# Patient Record
Sex: FEMALE | Race: BLACK OR AFRICAN AMERICAN | NOT HISPANIC OR LATINO | Employment: FULL TIME | RURAL
[De-identification: names, ages, dates, MRNs, and addresses within clinical notes are randomized per-mention and may not be internally consistent; named-entity substitution may affect disease eponyms.]

---

## 2017-11-29 ENCOUNTER — OFFICE VISIT (OUTPATIENT)
Dept: OBSTETRICS AND GYNECOLOGY | Facility: CLINIC | Age: 62
End: 2017-11-29

## 2017-11-29 VITALS
DIASTOLIC BLOOD PRESSURE: 73 MMHG | SYSTOLIC BLOOD PRESSURE: 141 MMHG | HEIGHT: 67 IN | WEIGHT: 236 LBS | BODY MASS INDEX: 37.04 KG/M2

## 2017-11-29 DIAGNOSIS — N81.6 POP-Q STAGE 3 RECTOCELE: ICD-10-CM

## 2017-11-29 DIAGNOSIS — N81.10 POP-Q STAGE 3 CYSTOCELE: ICD-10-CM

## 2017-11-29 DIAGNOSIS — N81.3 UTERINE PROCIDENTIA: Primary | Chronic | ICD-10-CM

## 2017-11-29 PROBLEM — E11.9 DIABETES MELLITUS: Chronic | Status: ACTIVE | Noted: 2017-11-29

## 2017-11-29 PROBLEM — E66.9 OBESITY, CLASS II, BMI 35-39.9: Chronic | Status: ACTIVE | Noted: 2017-11-29

## 2017-11-29 PROBLEM — I10 HYPERTENSION: Chronic | Status: ACTIVE | Noted: 2017-11-29

## 2017-11-29 PROBLEM — E11.9 TYPE 2 DIABETES MELLITUS WITHOUT COMPLICATION: Chronic | Status: ACTIVE | Noted: 2017-11-29

## 2017-11-29 PROCEDURE — 99204 OFFICE O/P NEW MOD 45 MIN: CPT | Performed by: OBSTETRICS & GYNECOLOGY

## 2017-11-29 RX ORDER — DILTIAZEM HYDROCHLORIDE 120 MG/1
TABLET, FILM COATED ORAL
COMMUNITY
Start: 2017-10-20

## 2017-11-29 RX ORDER — LINAGLIPTIN 5 MG/1
TABLET, FILM COATED ORAL
COMMUNITY
Start: 2017-10-18 | End: 2020-08-14

## 2017-11-29 RX ORDER — LISINOPRIL 40 MG/1
TABLET ORAL
COMMUNITY
Start: 2017-09-05

## 2017-11-29 RX ORDER — INSULIN DETEMIR 100 [IU]/ML
INJECTION, SOLUTION SUBCUTANEOUS
COMMUNITY
Start: 2017-10-17 | End: 2020-08-14

## 2017-11-29 RX ORDER — PROMETHAZINE HYDROCHLORIDE 25 MG/1
TABLET ORAL
COMMUNITY
Start: 2017-10-26 | End: 2020-12-03

## 2017-11-29 RX ORDER — ATORVASTATIN CALCIUM 40 MG/1
TABLET, FILM COATED ORAL
COMMUNITY
Start: 2017-11-07

## 2017-11-29 RX ORDER — GLIMEPIRIDE 4 MG/1
TABLET ORAL
COMMUNITY
Start: 2017-10-17 | End: 2020-08-14

## 2017-11-30 NOTE — PROGRESS NOTES
Ellen Storey is a 62 y.o. y/o female     Chief Complaint: My womb is low.    HPI: 62-year-old  with two prior vaginal deliveries.  Referred by Dr. Isauro Prado for pelvic organ prolapse.    She has hypertension and type 2 diabetes requiring insulin.    No known drug allergies.    She is single.  She states that she has not been sexually active for 20 years, she wants to leave that option open.    She is a nonsmoker.    She states that for quite some time she has known that her womb was low.  Does not cause her any pain.  She has not had any vaginal bleeding for about 8 years.  No urinary incontinence.  No problems with constipation.  She has a little bit of pressure but nothing real significant.    She was not aware that there were nonsurgical options for the treatment of pelvic prolapse.  We discussed pelvic floor exercises and pelvic for physical therapy and pessary use.  We also discussed surgical treatment.    She has an elongated cervix with procidentia and third-degree cystocele and third-degree rectocele.    Review of Systems   Constitutional: Negative for activity change, appetite change, chills, diaphoresis, fatigue, fever and unexpected weight change.   Gastrointestinal: Positive for diarrhea, nausea and vomiting. Negative for abdominal pain and constipation.   Genitourinary: Negative for difficulty urinating, dyspareunia, dysuria, menstrual problem, pelvic pain, urgency, vaginal bleeding, vaginal discharge and vaginal pain.   Neurological: Negative for headaches.   Psychiatric/Behavioral: Negative for dysphoric mood. The patient is not nervous/anxious.    All other systems reviewed and are negative.     Breast ROS: negative    The following portions of the patient's history were reviewed and updated as appropriate: allergies, current medications, past family history, past medical history, past social history, past surgical history and problem list.    No Known Allergies       Current Outpatient  "Prescriptions:   •  atorvastatin (LIPITOR) 40 MG tablet, , Disp: , Rfl:   •  diltiaZEM (CARDIZEM) 120 MG tablet, , Disp: , Rfl:   •  glimepiride (AMARYL) 4 MG tablet, , Disp: , Rfl:   •  LEVEMIR FLEXTOUCH 100 UNIT/ML injection, , Disp: , Rfl:   •  lisinopril (PRINIVIL,ZESTRIL) 40 MG tablet, , Disp: , Rfl:   •  metFORMIN (GLUCOPHAGE) 1000 MG tablet, , Disp: , Rfl:   •  promethazine (PHENERGAN) 25 MG tablet, , Disp: , Rfl:   •  TRADJENTA 5 MG tablet tablet, , Disp: , Rfl:      The patient has a family history of   No family history on file.     Past Medical History:   Diagnosis Date   • Diabetes mellitus    • Hypertension    • Obesity, Class II, BMI 35-39.9 2017   • POP-Q stage 3 cystocele 2017   • Type 2 diabetes mellitus without complication 2017   • Uterine procidentia 2017        OB History      Para Term  AB Living    2 2    2    SAB TAB Ectopic Multiple Live Births                   Social History     Social History   • Marital status: Single     Spouse name: N/A   • Number of children: N/A   • Years of education: N/A     Occupational History   • Not on file.     Social History Main Topics   • Smoking status: Former Smoker   • Smokeless tobacco: Former User   • Alcohol use No   • Drug use: No   • Sexual activity: No     Other Topics Concern   • Not on file     Social History Narrative   • No narrative on file        Past Surgical History:   Procedure Laterality Date   • BREAST BIOPSY          Patient Active Problem List   Diagnosis   • POP-Q stage 3 cystocele   • POP-Q stage 3 rectocele   • Uterine procidentia   • Hypertension   • Type 2 diabetes mellitus without complication   • Obesity, Class II, BMI 35-39.9        Documented Vitals    17 1105   BP: 141/73   Weight: 236 lb (107 kg)   Height: 67\" (170.2 cm)   PainSc: 0-No pain       Physical Exam   Constitutional: She is oriented to person, place, and time. No distress.   Obese black female weighing 236 pounds with " BMI 37.0   HENT:   Head: Normocephalic and atraumatic.   Eyes: Conjunctivae and EOM are normal. Pupils are equal, round, and reactive to light.   Neck: Normal range of motion. Neck supple. No JVD present. No tracheal deviation present. No thyromegaly present.   Cardiovascular: Normal rate, regular rhythm, normal heart sounds and intact distal pulses.  Exam reveals no gallop and no friction rub.    No murmur heard.  Pulmonary/Chest: Effort normal and breath sounds normal. No stridor. No respiratory distress. She has no wheezes. She has no rales. She exhibits no tenderness.   Abdominal: Soft. Bowel sounds are normal. She exhibits no distension and no mass. There is no tenderness. There is no rebound and no guarding. No hernia. Hernia confirmed negative in the right inguinal area and confirmed negative in the left inguinal area.   Genitourinary: Rectal exam shows no external hemorrhoid. No labial fusion. There is no rash, tenderness, lesion or injury on the right labia. There is no rash, tenderness, lesion or injury on the left labia. Uterus is enlarged. Uterus is not fixed and not tender. Cervix exhibits no motion tenderness, no discharge and no friability. Right adnexum displays no mass, no tenderness and no fullness. Left adnexum displays no mass, no tenderness and no fullness.   Genitourinary Comments: Cervix is just outside the vaginal introitus.  Procidentia.  Third-degree cystocele.  Third-degree rectocele.  Cervix is elongated.  Uterus appears to be slightly enlarged and irregular shaped consistent with small fibroids.   Musculoskeletal: Normal range of motion. She exhibits no edema, tenderness or deformity.   Lymphadenopathy:     She has no cervical adenopathy.        Right: No inguinal adenopathy present.        Left: No inguinal adenopathy present.   Neurological: She is alert and oriented to person, place, and time. She has normal reflexes. She displays normal reflexes. No cranial nerve deficit. She  exhibits normal muscle tone. Coordination normal.   Skin: Skin is warm and dry. No rash noted. She is not diaphoretic. No erythema. No pallor.   Psychiatric: She has a normal mood and affect. Her behavior is normal. Judgment and thought content normal.   Nursing note and vitals reviewed.       Assessment        Diagnosis Plan   1. Uterine procidentia     2. POP-Q stage 3 cystocele     3. POP-Q stage 3 rectocele           Plan      1. She is to let me know if things become more symptomatic or if she wants any type of treatment.    2. Encouraged in diet and exercise.  3. Handouts on depression, hot flashes, exercise, and vitamin use.   4. Follow-up in 6 months.  Follow-up sooner as needed.  5. Send copy of note to Dr. Isauro Prado.            This document has been electronically signed by Marek Bob MD on November 29, 2017 9:13 PM

## 2020-08-14 ENCOUNTER — OFFICE VISIT (OUTPATIENT)
Dept: ENDOCRINOLOGY | Facility: CLINIC | Age: 65
End: 2020-08-14

## 2020-08-14 VITALS
BODY MASS INDEX: 35.09 KG/M2 | SYSTOLIC BLOOD PRESSURE: 152 MMHG | HEART RATE: 64 BPM | WEIGHT: 223.6 LBS | OXYGEN SATURATION: 99 % | HEIGHT: 67 IN | DIASTOLIC BLOOD PRESSURE: 68 MMHG

## 2020-08-14 DIAGNOSIS — Z79.4 TYPE 2 DIABETES MELLITUS WITH HYPERGLYCEMIA, WITH LONG-TERM CURRENT USE OF INSULIN (HCC): Primary | ICD-10-CM

## 2020-08-14 DIAGNOSIS — E11.65 TYPE 2 DIABETES MELLITUS WITH HYPERGLYCEMIA, WITH LONG-TERM CURRENT USE OF INSULIN (HCC): Primary | ICD-10-CM

## 2020-08-14 PROCEDURE — 99204 OFFICE O/P NEW MOD 45 MIN: CPT | Performed by: INTERNAL MEDICINE

## 2020-08-14 RX ORDER — PEN NEEDLE, DIABETIC 30 GX3/16"
1 NEEDLE, DISPOSABLE MISCELLANEOUS 4 TIMES DAILY
Qty: 120 EACH | Refills: 11 | Status: SHIPPED | OUTPATIENT
Start: 2020-08-14 | End: 2021-08-30

## 2020-08-14 NOTE — PATIENT INSTRUCTIONS
Stop levemir, stop metformin     =============      Start soliqua 30 units before the first meal of the day     Every day , increase by 1 unit if the sugar when you wake up is more than 150 but do not exceed 45 units       =================    Start synjardy one tablet with the first meal of the day       ==========    Lyumjev    You can check your sugar before every meal and give insulin as follows     151-200 : 2units  201-250 : 4units  Above 250 : 6units     You could also use Lyumjev before heavy carb meals , about 5 to 6 units so that the sugar never rises in the first place

## 2020-08-14 NOTE — PROGRESS NOTES
"Ellen Storey is a 65 y.o. female who presents for  evaluation of   Chief Complaint   Patient presents with   • Establish Care   • Hypertension   • Diabetes       Referring provider     Primary Care Provider    Isauro Prado MD    Duration 10 years    Timing - Diabetes is Constant    Quality -  poorly controlled    Severity -  severe    Complications - retinopathy     Current symptoms/problems  none     Alleviating Factors: Compliance       Side Effects  none    Current diet  in general, a \"healthy\" diet      Current exercise walking    Current monitoring regimen: home blood tests - checking 2 x daily   Home blood sugar records: 100-200    Hypoglycemia none    Past Medical History:   Diagnosis Date   • Diabetes mellitus (CMS/[facility])    • Hypertension    • Obesity, Class II, BMI 35-39.9 11/29/2017   • POP-Q stage 3 cystocele 11/29/2017   • Uterine procidentia 11/29/2017     No family history on file.  Social History     Tobacco Use   • Smoking status: Former Smoker   • Smokeless tobacco: Former User   Substance Use Topics   • Alcohol use: No   • Drug use: No         Current Outpatient Medications:   •  atorvastatin (LIPITOR) 40 MG tablet, , Disp: , Rfl:   •  diltiaZEM (CARDIZEM) 120 MG tablet, , Disp: , Rfl:   •  lisinopril (PRINIVIL,ZESTRIL) 40 MG tablet, , Disp: , Rfl:   •  promethazine (PHENERGAN) 25 MG tablet, , Disp: , Rfl:   •  Continuous Blood Gluc  (FREESTYLE JIGNA 2 READER SYSTM) device, 1 each Continuous. Use as indicated for glucose monitoring, Disp: 1 each, Rfl: 1  •  Continuous Blood Gluc Sensor (FREESTYLE JIGNA 2 SENSOR SYSTM) misc, 1 each Every 14 (Fourteen) Days. Use every 14 days, Disp: 2 each, Rfl: 11  •  Empagliflozin-metFORMIN HCl ER (Synjardy XR)  MG tablet sustained-release 24 hour, Take 1 tablet/day by mouth Daily With Breakfast., Disp: 30 tablet, Rfl: 11  •  Insulin Glargine-Lixisenatide (Soliqua) 100-33 UNT-MCG/ML solution pen-injector injection, Start with 30 units before the " first meal of the day but may increase by 1unit daily up to 45 units until waking sugar is less than 150, Disp: 5 pen, Rfl: 11  •  Insulin Lispro-aabc, 1 U Dial, (Lyumjev KwikPen) 100 UNIT/ML solution pen-injector, Take 2 to 6 units with every meal, Disp: 2 pen, Rfl: 11  •  Insulin Pen Needle (PEN NEEDLES) 32G X 4 MM misc, 1 each 4 (Four) Times a Day. Use 4 x daily, Dx code E11.65, Disp: 120 each, Rfl: 11    Review of Systems    Review of Systems   Constitutional: Positive for fatigue. Negative for activity change, appetite change, chills, diaphoresis, fever and unexpected weight change.   HENT: Negative for congestion, dental problem, drooling, ear discharge, ear pain, facial swelling, mouth sores, postnasal drip, rhinorrhea, sinus pressure, sore throat, tinnitus, trouble swallowing and voice change.    Eyes: Negative for photophobia, pain, discharge, redness, itching and visual disturbance.   Respiratory: Negative for apnea, cough, choking, chest tightness, shortness of breath, wheezing and stridor.    Cardiovascular: Negative for chest pain, palpitations and leg swelling.   Gastrointestinal: Negative for abdominal distention, abdominal pain, constipation, diarrhea, nausea and vomiting.   Endocrine: Negative for cold intolerance, heat intolerance, polydipsia, polyphagia and polyuria.   Genitourinary: Negative for decreased urine volume, difficulty urinating, dysuria, flank pain, frequency, hematuria and urgency.   Musculoskeletal: Negative for arthralgias, back pain, gait problem, joint swelling, myalgias, neck pain and neck stiffness.   Skin: Negative for color change, pallor, rash and wound.   Allergic/Immunologic: Negative for immunocompromised state.   Neurological: Negative for dizziness, tremors, seizures, syncope, facial asymmetry, speech difficulty, weakness, light-headedness, numbness and headaches.   Hematological: Negative for adenopathy.   Psychiatric/Behavioral: Negative for agitation, behavioral  "problems, confusion, decreased concentration, dysphoric mood, hallucinations, self-injury, sleep disturbance and suicidal ideas. The patient is not nervous/anxious and is not hyperactive.         Objective:   /68 (BP Location: Left arm, Patient Position: Sitting, Cuff Size: Adult)   Pulse 64   Ht 170.2 cm (67\")   Wt 101 kg (223 lb 9.6 oz)   SpO2 99%   BMI 35.02 kg/m²     Physical Exam   Constitutional: She is oriented to person, place, and time. She appears well-developed.   HENT:   Head: Normocephalic.   Right Ear: External ear normal.   Left Ear: External ear normal.   Nose: Nose normal.   Eyes: Conjunctivae and EOM are normal. No scleral icterus.   Neck: Normal range of motion. Neck supple. No tracheal deviation present. No thyromegaly present.   Cardiovascular: Normal rate, regular rhythm, normal heart sounds and intact distal pulses. Exam reveals no gallop and no friction rub.   No murmur heard.  Pulmonary/Chest: Effort normal and breath sounds normal. No stridor. No respiratory distress. She has no wheezes. She has no rales. She exhibits no tenderness.   Abdominal: Soft. Bowel sounds are normal. She exhibits no distension and no mass. There is no tenderness. There is no rebound and no guarding.   Musculoskeletal: Normal range of motion. She exhibits no tenderness or deformity.   Lymphadenopathy:     She has no cervical adenopathy.   Neurological: She is alert and oriented to person, place, and time. She displays normal reflexes. She exhibits normal muscle tone. Coordination normal.   Skin: No rash noted. No erythema. No pallor.   Psychiatric: She has a normal mood and affect. Her behavior is normal. Judgment and thought content normal.       Lab Review    No results found for this or any previous visit.      Assessment/Plan       ICD-10-CM ICD-9-CM   1. Type 2 diabetes mellitus with hyperglycemia, with long-term current use of insulin (CMS/[facility]) E11.65 250.00    Z79.4 790.29     V58.67         I " reviewed and summarized records from Isauro Prado MD from current year  and I reviewed / ordered labs.   From review of records :    Pt has type 2 diabetes , uncontrolled, last HgbA1c 13         Glycemic Management:   No results found for: HGBA1C  No results found for: GLUCOSE, BUN, CREATININE, EGFRIFNONA, EGFRIFAFRI, BCR, K, CO2, CALCIUM, PROTENTOTREF, ALBUMIN, LABIL2, BILIRUBIN, AST, ALT, ANIONGAP  No results found for: WBC, HGB, HCT, MCV, PLT     Stop levemir, stop metformin     =============      Start soliqua 30 units before the first meal of the day     Every day , increase by 1 unit if the sugar when you wake up is more than 150 but do not exceed 45 units       =================    Start synjardy one tablet with the first meal of the day       ==========    Lyumjev    You can check your sugar before every meal and give insulin as follows     151-200 : 2units  201-250 : 4units  Above 250 : 6units     You could also use Lyumjev before heavy carb meals , about 5 to 6 units so that the sugar never rises in the first place           No orders of the defined types were placed in this encounter.        A copy of my note was sent to Isauro Prado MD    Please see my above opinion and suggestions.           This document has been electronically signed by Efren Crow MD on August 14, 2020 10:30

## 2020-08-24 ENCOUNTER — TELEPHONE (OUTPATIENT)
Dept: ENDOCRINOLOGY | Facility: CLINIC | Age: 65
End: 2020-08-24

## 2020-09-03 ENCOUNTER — OFFICE VISIT (OUTPATIENT)
Dept: ENDOCRINOLOGY | Facility: CLINIC | Age: 65
End: 2020-09-03

## 2020-09-03 VITALS
OXYGEN SATURATION: 98 % | BODY MASS INDEX: 34.2 KG/M2 | HEIGHT: 67 IN | HEART RATE: 77 BPM | SYSTOLIC BLOOD PRESSURE: 120 MMHG | WEIGHT: 217.9 LBS | DIASTOLIC BLOOD PRESSURE: 72 MMHG

## 2020-09-03 DIAGNOSIS — Z79.4 TYPE 2 DIABETES MELLITUS WITH HYPERGLYCEMIA, WITH LONG-TERM CURRENT USE OF INSULIN (HCC): Primary | ICD-10-CM

## 2020-09-03 DIAGNOSIS — E11.65 TYPE 2 DIABETES MELLITUS WITH HYPERGLYCEMIA, WITH LONG-TERM CURRENT USE OF INSULIN (HCC): Primary | ICD-10-CM

## 2020-09-03 PROCEDURE — 99214 OFFICE O/P EST MOD 30 MIN: CPT | Performed by: INTERNAL MEDICINE

## 2020-09-03 RX ORDER — INSULIN ASPART INJECTION 100 [IU]/ML
INJECTION, SOLUTION SUBCUTANEOUS
Qty: 3 PEN | Refills: 11 | Status: SHIPPED | OUTPATIENT
Start: 2020-09-03 | End: 2021-03-08 | Stop reason: SDUPTHER

## 2020-09-03 NOTE — PROGRESS NOTES
" Ellen Storey is a 65 y.o. female who presents for  evaluation of   Chief Complaint   Patient presents with   • Follow-up     3 week brielle type 2        Referring provider     Primary Care Provider    Isauro Prado MD    Duration 10 years    Timing - Diabetes is Constant    Quality -  poorly controlled    Severity -  severe    Complications - retinopathy     Current symptoms/problems  none     Alleviating Factors: Compliance       Side Effects  none    Current diet  in general, a \"healthy\" diet      Current exercise walking    Current monitoring regimen: home blood tests - checking 4 x daily   Home blood sugar records: 100-200    Hypoglycemia none    Past Medical History:   Diagnosis Date   • Diabetes mellitus (CMS/[facility])    • Hypertension    • Obesity, Class II, BMI 35-39.9 11/29/2017   • POP-Q stage 3 cystocele 11/29/2017   • Uterine procidentia 11/29/2017     No family history on file.  Social History     Tobacco Use   • Smoking status: Former Smoker   • Smokeless tobacco: Former User   Substance Use Topics   • Alcohol use: No   • Drug use: No         Current Outpatient Medications:   •  atorvastatin (LIPITOR) 40 MG tablet, , Disp: , Rfl:   •  Continuous Blood Gluc  (FREESTYLE JIGNA 2 READER SYSTM) device, 1 each Continuous. Use as indicated for glucose monitoring, Disp: 1 each, Rfl: 1  •  Continuous Blood Gluc Sensor (FREESTYLE JIGNA 2 SENSOR SYSTM) misc, 1 each Every 14 (Fourteen) Days. Use every 14 days, Disp: 2 each, Rfl: 11  •  diltiaZEM (CARDIZEM) 120 MG tablet, , Disp: , Rfl:   •  Empagliflozin-metFORMIN HCl ER (Synjardy XR)  MG tablet sustained-release 24 hour, Take 1 tablet/day by mouth Daily With Breakfast., Disp: 30 tablet, Rfl: 11  •  Insulin Glargine-Lixisenatide (Soliqua) 100-33 UNT-MCG/ML solution pen-injector injection, Start with 30 units before the first meal of the day but may increase by 1unit daily up to 45 units until waking sugar is less than 150, Disp: 5 pen, Rfl: 11  •  " Insulin Pen Needle (PEN NEEDLES) 32G X 4 MM misc, 1 each 4 (Four) Times a Day. Use 4 x daily, Dx code E11.65, Disp: 120 each, Rfl: 11  •  lisinopril (PRINIVIL,ZESTRIL) 40 MG tablet, , Disp: , Rfl:   •  promethazine (PHENERGAN) 25 MG tablet, , Disp: , Rfl:     Review of Systems    Review of Systems   Constitutional: Positive for fatigue. Negative for activity change, appetite change, chills, diaphoresis, fever and unexpected weight change.   HENT: Negative for congestion, dental problem, drooling, ear discharge, ear pain, facial swelling, mouth sores, postnasal drip, rhinorrhea, sinus pressure, sore throat, tinnitus, trouble swallowing and voice change.    Eyes: Negative for photophobia, pain, discharge, redness, itching and visual disturbance.   Respiratory: Negative for apnea, cough, choking, chest tightness, shortness of breath, wheezing and stridor.    Cardiovascular: Negative for chest pain, palpitations and leg swelling.   Gastrointestinal: Negative for abdominal distention, abdominal pain, constipation, diarrhea, nausea and vomiting.   Endocrine: Negative for cold intolerance, heat intolerance, polydipsia, polyphagia and polyuria.   Genitourinary: Negative for decreased urine volume, difficulty urinating, dysuria, flank pain, frequency, hematuria and urgency.   Musculoskeletal: Negative for arthralgias, back pain, gait problem, joint swelling, myalgias, neck pain and neck stiffness.   Skin: Negative for color change, pallor, rash and wound.   Allergic/Immunologic: Negative for immunocompromised state.   Neurological: Negative for dizziness, tremors, seizures, syncope, facial asymmetry, speech difficulty, weakness, light-headedness, numbness and headaches.   Hematological: Negative for adenopathy.   Psychiatric/Behavioral: Negative for agitation, behavioral problems, confusion, decreased concentration, dysphoric mood, hallucinations, self-injury, sleep disturbance and suicidal ideas. The patient is not  "nervous/anxious and is not hyperactive.         Objective:   /72 (BP Location: Right arm, Patient Position: Sitting, Cuff Size: Large Adult)   Pulse 77   Ht 170.2 cm (67\")   Wt 98.8 kg (217 lb 14.4 oz)   SpO2 98%   BMI 34.13 kg/m²     Physical Exam   Constitutional: She is oriented to person, place, and time. She appears well-developed.   HENT:   Head: Normocephalic.   Right Ear: External ear normal.   Left Ear: External ear normal.   Nose: Nose normal.   Eyes: Conjunctivae and EOM are normal. No scleral icterus.   Neck: Normal range of motion. Neck supple. No tracheal deviation present. No thyromegaly present.   Cardiovascular: Normal rate, regular rhythm, normal heart sounds and intact distal pulses. Exam reveals no gallop and no friction rub.   No murmur heard.  Pulmonary/Chest: Effort normal and breath sounds normal. No stridor. No respiratory distress. She has no wheezes. She has no rales. She exhibits no tenderness.   Abdominal: Soft. Bowel sounds are normal. She exhibits no distension and no mass. There is no tenderness. There is no rebound and no guarding.   Musculoskeletal: Normal range of motion. She exhibits no tenderness or deformity.   Lymphadenopathy:     She has no cervical adenopathy.   Neurological: She is alert and oriented to person, place, and time. She displays normal reflexes. She exhibits normal muscle tone. Coordination normal.   Skin: No rash noted. No erythema. No pallor.   Psychiatric: She has a normal mood and affect. Her behavior is normal. Judgment and thought content normal.       Lab Review    No results found for this or any previous visit.      Assessment/Plan       ICD-10-CM ICD-9-CM   1. Type 2 diabetes mellitus with hyperglycemia, with long-term current use of insulin (CMS/[facility]) E11.65 250.00    Z79.4 790.29     V58.67         I reviewed and summarized records from Isauro Prado MD from current year  and I reviewed / ordered labs.   From review of records :    Pt " has type 2 diabetes , uncontrolled, last HgbA1c 13         Glycemic Management:   No results found for: HGBA1C  No results found for: GLUCOSE, BUN, CREATININE, EGFRIFNONA, EGFRIFAFRI, BCR, K, CO2, CALCIUM, PROTENTOTREF, ALBUMIN, LABIL2, BILIRUBIN, AST, ALT, ANIONGAP  No results found for: WBC, HGB, HCT, MCV, PLT          soliqua 42 units -- increase to 55     =================     synjardy one tablet with the first meal of the day       ==========    Fiasp     You can check your sugar before every meal and give insulin as follows     151-200 : 2units  201-250 : 4units  Above 250 : 6units     You could also use Lyumjev before heavy carb meals , about 5 to 6 units so that the sugar never rises in the first place           No orders of the defined types were placed in this encounter.        A copy of my note was sent to Isauro Prado MD    Please see my above opinion and suggestions.           This document has been electronically signed by Efren Crow MD on September 3, 2020 11:34

## 2020-12-03 ENCOUNTER — OFFICE VISIT (OUTPATIENT)
Dept: ENDOCRINOLOGY | Facility: CLINIC | Age: 65
End: 2020-12-03

## 2020-12-03 VITALS
HEART RATE: 58 BPM | OXYGEN SATURATION: 100 % | HEIGHT: 67 IN | SYSTOLIC BLOOD PRESSURE: 122 MMHG | WEIGHT: 226.4 LBS | BODY MASS INDEX: 35.53 KG/M2 | DIASTOLIC BLOOD PRESSURE: 78 MMHG

## 2020-12-03 DIAGNOSIS — I15.2 HYPERTENSION ASSOCIATED WITH DIABETES (HCC): ICD-10-CM

## 2020-12-03 DIAGNOSIS — Z79.4 TYPE 2 DIABETES MELLITUS WITH HYPERGLYCEMIA, WITH LONG-TERM CURRENT USE OF INSULIN (HCC): Primary | ICD-10-CM

## 2020-12-03 DIAGNOSIS — E11.69 MIXED DIABETIC HYPERLIPIDEMIA ASSOCIATED WITH TYPE 2 DIABETES MELLITUS (HCC): ICD-10-CM

## 2020-12-03 DIAGNOSIS — E78.2 MIXED DIABETIC HYPERLIPIDEMIA ASSOCIATED WITH TYPE 2 DIABETES MELLITUS (HCC): ICD-10-CM

## 2020-12-03 DIAGNOSIS — E11.65 TYPE 2 DIABETES MELLITUS WITH HYPERGLYCEMIA, WITH LONG-TERM CURRENT USE OF INSULIN (HCC): Primary | ICD-10-CM

## 2020-12-03 DIAGNOSIS — E11.59 HYPERTENSION ASSOCIATED WITH DIABETES (HCC): ICD-10-CM

## 2020-12-03 LAB — HBA1C MFR BLD: 11.2 %

## 2020-12-03 PROCEDURE — 83036 HEMOGLOBIN GLYCOSYLATED A1C: CPT | Performed by: INTERNAL MEDICINE

## 2020-12-03 PROCEDURE — 95250 CONT GLUC MNTR PHYS/QHP EQP: CPT | Performed by: INTERNAL MEDICINE

## 2020-12-03 PROCEDURE — 99214 OFFICE O/P EST MOD 30 MIN: CPT | Performed by: INTERNAL MEDICINE

## 2020-12-03 RX ORDER — PROMETHAZINE HYDROCHLORIDE 12.5 MG/1
12.5 TABLET ORAL EVERY 6 HOURS PRN
Qty: 45 TABLET | Refills: 11 | Status: SHIPPED | OUTPATIENT
Start: 2020-12-03 | End: 2022-02-10

## 2020-12-03 NOTE — PROGRESS NOTES
" Ellen Storey is a 65 y.o. female who presents for  evaluation of   Chief Complaint   Patient presents with   • Follow-up     3 mo diabetes        Referring provider     Primary Care Provider    Isauro Prado MD    Duration 10 years    Timing - Diabetes is Constant    Quality -  poorly controlled - improved     Severity -  severe    Complications - retinopathy     Current symptoms/problems  none     Alleviating Factors: Compliance       Side Effects  none    Current diet  in general, a \"healthy\" diet      Current exercise walking    Current monitoring regimen: home blood tests - checking 4 x daily   Home blood sugar records: 100-200    Hypoglycemia none    Past Medical History:   Diagnosis Date   • Diabetes mellitus (CMS/HCC)    • Hypertension    • Obesity, Class II, BMI 35-39.9 11/29/2017   • POP-Q stage 3 cystocele 11/29/2017   • Uterine procidentia 11/29/2017     No family history on file.  Social History     Tobacco Use   • Smoking status: Former Smoker   • Smokeless tobacco: Former User   Substance Use Topics   • Alcohol use: No   • Drug use: No         Current Outpatient Medications:   •  atorvastatin (LIPITOR) 40 MG tablet, , Disp: , Rfl:   •  diltiaZEM (CARDIZEM) 120 MG tablet, , Disp: , Rfl:   •  Empagliflozin-metFORMIN HCl ER (Synjardy XR)  MG tablet sustained-release 24 hour, Take 1 tablet/day by mouth Daily With Breakfast., Disp: 30 tablet, Rfl: 11  •  Insulin aspart (FIASP FLEXTOUCH) 100 UNIT/ML solution pen-injector injection pen, 2 to 10 units w meals, Disp: 3 pen, Rfl: 11  •  Insulin Glargine-Lixisenatide (Soliqua) 100-33 UNT-MCG/ML solution pen-injector injection, 55 units qam, Disp: 6 pen, Rfl: 11  •  Insulin Pen Needle (PEN NEEDLES) 32G X 4 MM misc, 1 each 4 (Four) Times a Day. Use 4 x daily, Dx code E11.65, Disp: 120 each, Rfl: 11  •  lisinopril (PRINIVIL,ZESTRIL) 40 MG tablet, , Disp: , Rfl:   •  promethazine (PHENERGAN) 12.5 MG tablet, Take 1 tablet by mouth Every 6 (Six) Hours As " "Needed for Nausea or Vomiting., Disp: 45 tablet, Rfl: 11    Review of Systems    Review of Systems   Constitutional: Positive for fatigue. Negative for activity change, appetite change, chills, diaphoresis, fever and unexpected weight change.   HENT: Negative for congestion, dental problem, drooling, ear discharge, ear pain, facial swelling, mouth sores, postnasal drip, rhinorrhea, sinus pressure, sore throat, tinnitus, trouble swallowing and voice change.    Eyes: Negative for photophobia, pain, discharge, redness, itching and visual disturbance.   Respiratory: Negative for apnea, cough, choking, chest tightness, shortness of breath, wheezing and stridor.    Cardiovascular: Negative for chest pain, palpitations and leg swelling.   Gastrointestinal: Negative for abdominal distention, abdominal pain, constipation, diarrhea, nausea and vomiting.   Endocrine: Negative for cold intolerance, heat intolerance, polydipsia, polyphagia and polyuria.   Genitourinary: Negative for decreased urine volume, difficulty urinating, dysuria, flank pain, frequency, hematuria and urgency.   Musculoskeletal: Negative for arthralgias, back pain, gait problem, joint swelling, myalgias, neck pain and neck stiffness.   Skin: Negative for color change, pallor, rash and wound.   Allergic/Immunologic: Negative for immunocompromised state.   Neurological: Negative for dizziness, tremors, seizures, syncope, facial asymmetry, speech difficulty, weakness, light-headedness, numbness and headaches.   Hematological: Negative for adenopathy.   Psychiatric/Behavioral: Negative for agitation, behavioral problems, confusion, decreased concentration, dysphoric mood, hallucinations, self-injury, sleep disturbance and suicidal ideas. The patient is not nervous/anxious and is not hyperactive.         Objective:   /78 (BP Location: Right arm, Patient Position: Sitting, Cuff Size: Large Adult)   Pulse 58   Ht 170.2 cm (67\")   Wt 103 kg (226 lb 6.4 oz) "   SpO2 100%   BMI 35.46 kg/m²     Physical Exam   Constitutional: She is oriented to person, place, and time. She appears well-developed.   HENT:   Head: Normocephalic.   Right Ear: External ear normal.   Left Ear: External ear normal.   Nose: Nose normal.   Eyes: Conjunctivae are normal. No scleral icterus.   Neck: Normal range of motion. Neck supple. No tracheal deviation present. No thyromegaly present.   Cardiovascular: Normal rate, regular rhythm and normal heart sounds. Exam reveals no gallop and no friction rub.   No murmur heard.  Pulmonary/Chest: Effort normal and breath sounds normal. No stridor. No respiratory distress. She has no wheezes. She has no rales. She exhibits no tenderness.   Abdominal: Soft. Bowel sounds are normal. She exhibits no distension and no mass. There is no abdominal tenderness. There is no rebound and no guarding.   Musculoskeletal: Normal range of motion. No tenderness or deformity.   Lymphadenopathy:     She has no cervical adenopathy.   Neurological: She is alert and oriented to person, place, and time. She displays normal reflexes. She exhibits normal muscle tone. Coordination normal.   Skin: No rash noted. No erythema. No pallor.   Psychiatric: Her behavior is normal. Judgment and thought content normal.       Lab Review    No results found for this or any previous visit.      Assessment/Plan       ICD-10-CM ICD-9-CM   1. Type 2 diabetes mellitus with hyperglycemia, with long-term current use of insulin (CMS/[facility])  E11.65 250.00    Z79.4 790.29     V58.67   2. Hypertension associated with diabetes (CMS/[facility])  E11.59 250.80    I10 401.9   3. Mixed diabetic hyperlipidemia associated with type 2 diabetes mellitus (CMS/[facility])  E11.69 250.80    E78.2 272.2         I reviewed and summarized records from Isauro Prado MD from current year  and I reviewed / ordered labs.   From review of records :    Pt has type 2 diabetes , uncontrolled, last HgbA1c 13         Glycemic Management:    No results found for: HGBA1C  No results found for: GLUCOSE, BUN, CREATININE, EGFRIFNONA, EGFRIFAFRI, BCR, K, CO2, CALCIUM, PROTENTOTREF, ALBUMIN, LABIL2, BILIRUBIN, AST, ALT, ANIONGAP  No results found for: WBC, HGB, HCT, MCV, PLT          soliqua 42 units -- increase to 55   Doing 56 units qam   Decrease to 46    =================     synjardy one tablet with the first meal of the day   10/1000    ==========    Fiasp     You can check your sugar before every meal and give insulin as follows     151-200 : 2units  201-250 : 4units  Above 250 : 6units     You could also use Lyumjev before heavy carb meals , about 5 to 6 units so that the sugar never rises in the first place         Added 10 for supper       Uncontrolled diabetes  Hyperglycemia    Insertion of continuous glucose monitor to define pattern, escobar professional     The continuous glucose monitor that was inserted is a escobar glucose monitor / office supplied     She will mail back and I will call       --    Dyslipidemia on lipitor     No results found for: CHOL, CHLPL, TRIG, HDL, LDL, LDLDIRECT    --    HTN controlled on lisinopril regimen       No orders of the defined types were placed in this encounter.        A copy of my note was sent to Isauro Prado MD    Please see my above opinion and suggestions.           This document has been electronically signed by Efren Crow MD on December 3, 2020 10:33 CST

## 2020-12-16 ENCOUNTER — TREATMENT (OUTPATIENT)
Dept: ENDOCRINOLOGY | Facility: CLINIC | Age: 65
End: 2020-12-16

## 2020-12-16 DIAGNOSIS — Z79.4 TYPE 2 DIABETES MELLITUS WITH HYPERGLYCEMIA, WITH LONG-TERM CURRENT USE OF INSULIN (HCC): Primary | ICD-10-CM

## 2020-12-16 DIAGNOSIS — E11.65 TYPE 2 DIABETES MELLITUS WITH HYPERGLYCEMIA, WITH LONG-TERM CURRENT USE OF INSULIN (HCC): Primary | ICD-10-CM

## 2020-12-16 PROCEDURE — 95251 CONT GLUC MNTR ANALYSIS I&R: CPT | Performed by: INTERNAL MEDICINE

## 2020-12-16 NOTE — PROGRESS NOTES
Patient returned Freestyle [employer] Professional for reviewed            She is doing soliqua 46     Fiasp 10 in the morning at night      Soliqua 10 and Fiasp 10 only if high meals       I suggested no changes      In Range 63%    2% lows, so consider hypoglycemia    35% elevated, so consider hyperglycemia     Type 2 diabetes with hyperglycemia

## 2021-03-03 ENCOUNTER — LAB (OUTPATIENT)
Dept: LAB | Facility: HOSPITAL | Age: 66
End: 2021-03-03

## 2021-03-03 ENCOUNTER — OFFICE VISIT (OUTPATIENT)
Dept: ENDOCRINOLOGY | Facility: CLINIC | Age: 66
End: 2021-03-03

## 2021-03-03 VITALS
BODY MASS INDEX: 33.65 KG/M2 | OXYGEN SATURATION: 97 % | HEART RATE: 56 BPM | HEIGHT: 67 IN | SYSTOLIC BLOOD PRESSURE: 106 MMHG | DIASTOLIC BLOOD PRESSURE: 60 MMHG | WEIGHT: 214.4 LBS

## 2021-03-03 DIAGNOSIS — I15.2 HYPERTENSION ASSOCIATED WITH DIABETES (HCC): ICD-10-CM

## 2021-03-03 DIAGNOSIS — E11.65 TYPE 2 DIABETES MELLITUS WITH HYPERGLYCEMIA, WITH LONG-TERM CURRENT USE OF INSULIN (HCC): Primary | ICD-10-CM

## 2021-03-03 DIAGNOSIS — E11.59 HYPERTENSION ASSOCIATED WITH DIABETES (HCC): ICD-10-CM

## 2021-03-03 DIAGNOSIS — Z79.4 TYPE 2 DIABETES MELLITUS WITH HYPERGLYCEMIA, WITH LONG-TERM CURRENT USE OF INSULIN (HCC): Primary | ICD-10-CM

## 2021-03-03 DIAGNOSIS — E78.2 MIXED DIABETIC HYPERLIPIDEMIA ASSOCIATED WITH TYPE 2 DIABETES MELLITUS (HCC): ICD-10-CM

## 2021-03-03 DIAGNOSIS — E55.9 VITAMIN D DEFICIENCY: ICD-10-CM

## 2021-03-03 DIAGNOSIS — E11.69 MIXED DIABETIC HYPERLIPIDEMIA ASSOCIATED WITH TYPE 2 DIABETES MELLITUS (HCC): ICD-10-CM

## 2021-03-03 LAB
25(OH)D3 SERPL-MCNC: 35.3 NG/ML
ALBUMIN SERPL-MCNC: 4.1 G/DL (ref 3.5–5.2)
ALBUMIN UR-MCNC: 3.3 MG/DL
ALBUMIN/GLOB SERPL: 1.4 G/DL
ALP SERPL-CCNC: 145 U/L (ref 39–117)
ALT SERPL W P-5'-P-CCNC: 11 U/L (ref 1–33)
ANION GAP SERPL CALCULATED.3IONS-SCNC: 12.3 MMOL/L (ref 5–15)
AST SERPL-CCNC: 11 U/L (ref 1–32)
BASOPHILS # BLD AUTO: 0.06 10*3/MM3 (ref 0–0.2)
BASOPHILS NFR BLD AUTO: 1 % (ref 0–1.5)
BILIRUB SERPL-MCNC: 0.5 MG/DL (ref 0–1.2)
BUN SERPL-MCNC: 34 MG/DL (ref 8–23)
BUN/CREAT SERPL: 28.6 (ref 7–25)
CALCIUM SPEC-SCNC: 9.7 MG/DL (ref 8.6–10.5)
CHLORIDE SERPL-SCNC: 101 MMOL/L (ref 98–107)
CHOLEST SERPL-MCNC: 120 MG/DL (ref 0–200)
CO2 SERPL-SCNC: 24.7 MMOL/L (ref 22–29)
CREAT SERPL-MCNC: 1.19 MG/DL (ref 0.57–1)
CREAT UR-MCNC: 35.8 MG/DL
DEPRECATED RDW RBC AUTO: 45.2 FL (ref 37–54)
EOSINOPHIL # BLD AUTO: 0.24 10*3/MM3 (ref 0–0.4)
EOSINOPHIL NFR BLD AUTO: 4.1 % (ref 0.3–6.2)
ERYTHROCYTE [DISTWIDTH] IN BLOOD BY AUTOMATED COUNT: 14 % (ref 12.3–15.4)
GFR SERPL CREATININE-BSD FRML MDRD: 55 ML/MIN/1.73
GLOBULIN UR ELPH-MCNC: 3 GM/DL
GLUCOSE SERPL-MCNC: 359 MG/DL (ref 65–99)
HBA1C MFR BLD: 11.98 % (ref 4.8–5.6)
HCT VFR BLD AUTO: 44.3 % (ref 34–46.6)
HDLC SERPL-MCNC: 48 MG/DL (ref 40–60)
HGB BLD-MCNC: 14.5 G/DL (ref 12–15.9)
LDLC SERPL CALC-MCNC: 53 MG/DL (ref 0–100)
LDLC/HDLC SERPL: 1.08 {RATIO}
LYMPHOCYTES # BLD AUTO: 1.65 10*3/MM3 (ref 0.7–3.1)
LYMPHOCYTES NFR BLD AUTO: 28.2 % (ref 19.6–45.3)
MCH RBC QN AUTO: 29.4 PG (ref 26.6–33)
MCHC RBC AUTO-ENTMCNC: 32.7 G/DL (ref 31.5–35.7)
MCV RBC AUTO: 89.7 FL (ref 79–97)
MICROALBUMIN/CREAT UR: 92.2 MG/G
MONOCYTES # BLD AUTO: 0.6 10*3/MM3 (ref 0.1–0.9)
MONOCYTES NFR BLD AUTO: 10.2 % (ref 5–12)
NEUTROPHILS NFR BLD AUTO: 3.27 10*3/MM3 (ref 1.7–7)
NEUTROPHILS NFR BLD AUTO: 55.8 % (ref 42.7–76)
PLATELET # BLD AUTO: 293 10*3/MM3 (ref 140–450)
PMV BLD AUTO: 12.9 FL (ref 6–12)
POTASSIUM SERPL-SCNC: 4 MMOL/L (ref 3.5–5.2)
PROT SERPL-MCNC: 7.1 G/DL (ref 6–8.5)
RBC # BLD AUTO: 4.94 10*6/MM3 (ref 3.77–5.28)
SODIUM SERPL-SCNC: 138 MMOL/L (ref 136–145)
TRIGL SERPL-MCNC: 101 MG/DL (ref 0–150)
TSH SERPL DL<=0.05 MIU/L-ACNC: 1.55 UIU/ML (ref 0.27–4.2)
VIT B12 BLD-MCNC: 347 PG/ML (ref 211–946)
VLDLC SERPL-MCNC: 19 MG/DL (ref 5–40)
WBC # BLD AUTO: 5.86 10*3/MM3 (ref 3.4–10.8)

## 2021-03-03 PROCEDURE — 84443 ASSAY THYROID STIM HORMONE: CPT | Performed by: INTERNAL MEDICINE

## 2021-03-03 PROCEDURE — 82306 VITAMIN D 25 HYDROXY: CPT | Performed by: INTERNAL MEDICINE

## 2021-03-03 PROCEDURE — 83036 HEMOGLOBIN GLYCOSYLATED A1C: CPT | Performed by: INTERNAL MEDICINE

## 2021-03-03 PROCEDURE — 36415 COLL VENOUS BLD VENIPUNCTURE: CPT | Performed by: INTERNAL MEDICINE

## 2021-03-03 PROCEDURE — 99214 OFFICE O/P EST MOD 30 MIN: CPT | Performed by: INTERNAL MEDICINE

## 2021-03-03 PROCEDURE — 82570 ASSAY OF URINE CREATININE: CPT | Performed by: INTERNAL MEDICINE

## 2021-03-03 PROCEDURE — 80053 COMPREHEN METABOLIC PANEL: CPT | Performed by: INTERNAL MEDICINE

## 2021-03-03 PROCEDURE — 80061 LIPID PANEL: CPT | Performed by: INTERNAL MEDICINE

## 2021-03-03 PROCEDURE — 82607 VITAMIN B-12: CPT | Performed by: INTERNAL MEDICINE

## 2021-03-03 PROCEDURE — 85025 COMPLETE CBC W/AUTO DIFF WBC: CPT | Performed by: INTERNAL MEDICINE

## 2021-03-03 PROCEDURE — 82043 UR ALBUMIN QUANTITATIVE: CPT | Performed by: INTERNAL MEDICINE

## 2021-03-03 NOTE — PROGRESS NOTES
" Ellen Storey is a 66 y.o. female who presents for  evaluation of   Chief Complaint   Patient presents with   • Follow-up     3 mo DM       Referring provider     Primary Care Provider    Isauro Prado MD    Duration 10 years    Timing - Diabetes is Constant    Quality -  poorly controlled - improved     Severity -  severe    Complications - retinopathy     Current symptoms/problems  none     Alleviating Factors: Compliance       Side Effects  none    Current diet  in general, a \"healthy\" diet      Current exercise walking    Current monitoring regimen: home blood tests - checking 4 x daily   Home blood sugar records: 100-200    Hypoglycemia none    Past Medical History:   Diagnosis Date   • Diabetes mellitus (CMS/HCC)    • Hypertension    • Obesity, Class II, BMI 35-39.9 11/29/2017   • POP-Q stage 3 cystocele 11/29/2017   • Uterine procidentia 11/29/2017     No family history on file.  Social History     Tobacco Use   • Smoking status: Former Smoker   • Smokeless tobacco: Former User   Substance Use Topics   • Alcohol use: No   • Drug use: No         Current Outpatient Medications:   •  atorvastatin (LIPITOR) 40 MG tablet, , Disp: , Rfl:   •  diltiaZEM (CARDIZEM) 120 MG tablet, , Disp: , Rfl:   •  Empagliflozin-metFORMIN HCl ER (Synjardy XR)  MG tablet sustained-release 24 hour, Take 1 tablet/day by mouth Daily With Breakfast., Disp: 30 tablet, Rfl: 11  •  Insulin aspart (FIASP FLEXTOUCH) 100 UNIT/ML solution pen-injector injection pen, Up to 20 units w meals, Disp: 6 pen, Rfl: 11  •  Insulin Glargine-Lixisenatide (Soliqua) 100-33 UNT-MCG/ML solution pen-injector injection, 55 units qam, Disp: 6 pen, Rfl: 11  •  Insulin Pen Needle (PEN NEEDLES) 32G X 4 MM misc, 1 each 4 (Four) Times a Day. Use 4 x daily, Dx code E11.65, Disp: 120 each, Rfl: 11  •  lisinopril (PRINIVIL,ZESTRIL) 40 MG tablet, , Disp: , Rfl:   •  promethazine (PHENERGAN) 12.5 MG tablet, Take 1 tablet by mouth Every 6 (Six) Hours As Needed " "for Nausea or Vomiting., Disp: 45 tablet, Rfl: 11    Review of Systems    Review of Systems   Constitutional: Negative for activity change, appetite change, chills, diaphoresis, fatigue, fever and unexpected weight change.   HENT: Negative for congestion, dental problem, drooling, ear discharge, ear pain, facial swelling, mouth sores, postnasal drip, rhinorrhea, sinus pressure, sore throat, tinnitus, trouble swallowing and voice change.    Eyes: Negative for photophobia, pain, discharge, redness, itching and visual disturbance.   Respiratory: Negative for apnea, cough, choking, chest tightness, shortness of breath, wheezing and stridor.    Cardiovascular: Negative for chest pain, palpitations and leg swelling.   Gastrointestinal: Negative for abdominal distention, abdominal pain, constipation, diarrhea, nausea and vomiting.   Endocrine: Negative for cold intolerance, heat intolerance, polydipsia, polyphagia and polyuria.   Genitourinary: Negative for decreased urine volume, difficulty urinating, dysuria, flank pain, frequency, hematuria and urgency.   Musculoskeletal: Negative for arthralgias, back pain, gait problem, joint swelling, myalgias, neck pain and neck stiffness.   Skin: Negative for color change, pallor, rash and wound.   Allergic/Immunologic: Negative for immunocompromised state.   Neurological: Negative for dizziness, tremors, seizures, syncope, facial asymmetry, speech difficulty, weakness, light-headedness, numbness and headaches.   Hematological: Negative for adenopathy.   Psychiatric/Behavioral: Negative for agitation, behavioral problems, confusion, decreased concentration, dysphoric mood, hallucinations, self-injury, sleep disturbance and suicidal ideas. The patient is not nervous/anxious and is not hyperactive.         Objective:   /60   Pulse 56   Ht 170.2 cm (67\")   Wt 97.3 kg (214 lb 6.4 oz)   SpO2 97%   BMI 33.58 kg/m²     Physical Exam   Constitutional: She is oriented to person, " place, and time. She appears well-developed.   HENT:   Head: Normocephalic.   Right Ear: External ear normal.   Left Ear: External ear normal.   Nose: Nose normal.   Eyes: Conjunctivae are normal. No scleral icterus.   Neck: Normal range of motion. Neck supple. No tracheal deviation present. No thyromegaly present.   Cardiovascular: Normal rate, regular rhythm and normal heart sounds. Exam reveals no gallop and no friction rub.   No murmur heard.  Pulmonary/Chest: Effort normal and breath sounds normal. No stridor. No respiratory distress. She has no wheezes. She has no rales. She exhibits no tenderness.   Abdominal: Soft. Bowel sounds are normal. She exhibits no distension and no mass. There is no abdominal tenderness. There is no rebound and no guarding.   Musculoskeletal: Normal range of motion. No tenderness or deformity.   Lymphadenopathy:     She has no cervical adenopathy.   Neurological: She is alert and oriented to person, place, and time. She displays normal reflexes. She exhibits normal muscle tone. Coordination normal.   Skin: No rash noted. No erythema. No pallor.   Psychiatric: Her behavior is normal. Judgment and thought content normal.       Lab Review    Results for orders placed or performed in visit on 03/03/21   CBC Auto Differential    Specimen: Blood   Result Value Ref Range    WBC 5.86 3.40 - 10.80 10*3/mm3    RBC 4.94 3.77 - 5.28 10*6/mm3    Hemoglobin 14.5 12.0 - 15.9 g/dL    Hematocrit 44.3 34.0 - 46.6 %    MCV 89.7 79.0 - 97.0 fL    MCH 29.4 26.6 - 33.0 pg    MCHC 32.7 31.5 - 35.7 g/dL    RDW 14.0 12.3 - 15.4 %    RDW-SD 45.2 37.0 - 54.0 fl    MPV 12.9 (H) 6.0 - 12.0 fL    Platelets 293 140 - 450 10*3/mm3    Neutrophil % 55.8 42.7 - 76.0 %    Lymphocyte % 28.2 19.6 - 45.3 %    Monocyte % 10.2 5.0 - 12.0 %    Eosinophil % 4.1 0.3 - 6.2 %    Basophil % 1.0 0.0 - 1.5 %    Neutrophils, Absolute 3.27 1.70 - 7.00 10*3/mm3    Lymphocytes, Absolute 1.65 0.70 - 3.10 10*3/mm3    Monocytes, Absolute  0.60 0.10 - 0.90 10*3/mm3    Eosinophils, Absolute 0.24 0.00 - 0.40 10*3/mm3    Basophils, Absolute 0.06 0.00 - 0.20 10*3/mm3   Comprehensive Metabolic Panel    Specimen: Blood   Result Value Ref Range    Glucose 359 (H) 65 - 99 mg/dL    BUN 34 (H) 8 - 23 mg/dL    Creatinine 1.19 (H) 0.57 - 1.00 mg/dL    Sodium 138 136 - 145 mmol/L    Potassium 4.0 3.5 - 5.2 mmol/L    Chloride 101 98 - 107 mmol/L    CO2 24.7 22.0 - 29.0 mmol/L    Calcium 9.7 8.6 - 10.5 mg/dL    Total Protein 7.1 6.0 - 8.5 g/dL    Albumin 4.10 3.50 - 5.20 g/dL    ALT (SGPT) 11 1 - 33 U/L    AST (SGOT) 11 1 - 32 U/L    Alkaline Phosphatase 145 (H) 39 - 117 U/L    Total Bilirubin 0.5 0.0 - 1.2 mg/dL    eGFR  African Amer 55 (L) >60 mL/min/1.73    Globulin 3.0 gm/dL    A/G Ratio 1.4 g/dL    BUN/Creatinine Ratio 28.6 (H) 7.0 - 25.0    Anion Gap 12.3 5.0 - 15.0 mmol/L   Hemoglobin A1c    Specimen: Blood   Result Value Ref Range    Hemoglobin A1C 11.98 (H) 4.80 - 5.60 %   Lipid Panel    Specimen: Blood   Result Value Ref Range    Total Cholesterol 120 0 - 200 mg/dL    Triglycerides 101 0 - 150 mg/dL    HDL Cholesterol 48 40 - 60 mg/dL    LDL Cholesterol  53 0 - 100 mg/dL    VLDL Cholesterol 19 5 - 40 mg/dL    LDL/HDL Ratio 1.08    Microalbumin / Creatinine Urine Ratio - Urine, Clean Catch    Specimen: Urine, Clean Catch   Result Value Ref Range    Microalbumin/Creatinine Ratio 92.2 mg/g    Creatinine, Urine 35.8 mg/dL    Microalbumin, Urine 3.3 mg/dL   TSH    Specimen: Blood   Result Value Ref Range    TSH 1.550 0.270 - 4.200 uIU/mL   Vitamin B12    Specimen: Blood   Result Value Ref Range    Vitamin B-12 347 211 - 946 pg/mL   Vitamin D 25 Hydroxy    Specimen: Blood   Result Value Ref Range    25 Hydroxy, Vitamin D 35.3 ng/ml         Assessment/Plan       ICD-10-CM ICD-9-CM   1. Type 2 diabetes mellitus with hyperglycemia, with long-term current use of insulin (CMS/[facility])  E11.65 250.00    Z79.4 790.29     V58.67   2. Hypertension associated with diabetes  (CMS/[facility])  E11.59 250.80    I15.2 401.9   3. Mixed diabetic hyperlipidemia associated with type 2 diabetes mellitus (CMS/[facility])  E11.69 250.80    E78.2 272.2   4. Vitamin D deficiency  E55.9 268.9         I reviewed and summarized records from Isauro Prado MD from current year  and I reviewed / ordered labs.   From review of records :    Pt has type 2 diabetes , uncontrolled, last HgbA1c 13         Glycemic Management:   Lab Results   Component Value Date    HGBA1C 11.98 (H) 03/03/2021    HGBA1C 11.2 12/03/2020     Lab Results   Component Value Date    GLUCOSE 359 (H) 03/03/2021    BUN 34 (H) 03/03/2021    CREATININE 1.19 (H) 03/03/2021    EGFRIFAFRI 55 (L) 03/03/2021    BCR 28.6 (H) 03/03/2021    K 4.0 03/03/2021    CO2 24.7 03/03/2021    CALCIUM 9.7 03/03/2021    ALBUMIN 4.10 03/03/2021    AST 11 03/03/2021    ALT 11 03/03/2021    ANIONGAP 12.3 03/03/2021     Lab Results   Component Value Date    WBC 5.86 03/03/2021    HGB 14.5 03/03/2021    HCT 44.3 03/03/2021    MCV 89.7 03/03/2021     03/03/2021             soliqua 46 am and 10 pm change to 60 am only   =================     synjardy one tablet with the first meal of the day   10/1000    ==========    Fiasp     About 10 for meals     10 for small meals, 15 for bigger meals       Patrick too expensive       --    Dyslipidemia on lipitor     Lab Results   Component Value Date    CHOL 120 03/03/2021    TRIG 101 03/03/2021    HDL 48 03/03/2021    LDL 53 03/03/2021       --    HTN controlled on lisinopril regimen       Orders Placed This Encounter   Procedures   • CBC Auto Differential   • Comprehensive Metabolic Panel   • Hemoglobin A1c   • Lipid Panel   • Microalbumin / Creatinine Urine Ratio - Urine, Clean Catch   • TSH   • Vitamin B12   • Vitamin D 25 Hydroxy         A copy of my note was sent to Isauro Prado MD    Please see my above opinion and suggestions.           This document has been electronically signed by Efren Crow MD  on March 8, 2021 08:29 CST

## 2021-03-08 RX ORDER — INSULIN ASPART INJECTION 100 [IU]/ML
INJECTION, SOLUTION SUBCUTANEOUS
Qty: 6 PEN | Refills: 11 | Status: SHIPPED | OUTPATIENT
Start: 2021-03-08 | End: 2021-12-06

## 2021-06-07 ENCOUNTER — OFFICE VISIT (OUTPATIENT)
Dept: ENDOCRINOLOGY | Facility: CLINIC | Age: 66
End: 2021-06-07

## 2021-06-07 VITALS
WEIGHT: 215 LBS | DIASTOLIC BLOOD PRESSURE: 58 MMHG | OXYGEN SATURATION: 98 % | SYSTOLIC BLOOD PRESSURE: 106 MMHG | HEIGHT: 67 IN | HEART RATE: 59 BPM | BODY MASS INDEX: 33.74 KG/M2

## 2021-06-07 DIAGNOSIS — E11.65 TYPE 2 DIABETES MELLITUS WITH HYPERGLYCEMIA, WITH LONG-TERM CURRENT USE OF INSULIN (HCC): Primary | ICD-10-CM

## 2021-06-07 DIAGNOSIS — E11.69 MIXED DIABETIC HYPERLIPIDEMIA ASSOCIATED WITH TYPE 2 DIABETES MELLITUS (HCC): ICD-10-CM

## 2021-06-07 DIAGNOSIS — I15.2 HYPERTENSION ASSOCIATED WITH DIABETES (HCC): ICD-10-CM

## 2021-06-07 DIAGNOSIS — E78.2 MIXED DIABETIC HYPERLIPIDEMIA ASSOCIATED WITH TYPE 2 DIABETES MELLITUS (HCC): ICD-10-CM

## 2021-06-07 DIAGNOSIS — E11.59 HYPERTENSION ASSOCIATED WITH DIABETES (HCC): ICD-10-CM

## 2021-06-07 DIAGNOSIS — Z79.4 TYPE 2 DIABETES MELLITUS WITH HYPERGLYCEMIA, WITH LONG-TERM CURRENT USE OF INSULIN (HCC): Primary | ICD-10-CM

## 2021-06-07 PROCEDURE — 99214 OFFICE O/P EST MOD 30 MIN: CPT | Performed by: INTERNAL MEDICINE

## 2021-06-07 NOTE — PROGRESS NOTES
" Ellen Storey is a 66 y.o. female who presents for  evaluation of   Chief Complaint   Patient presents with   • Diabetes     type 2       HPI    Duration 10 years     Quality -  poorly controlled      Severity -  severe    Complications - retinopathy , ckd stage III    Current symptoms/problems  none     Alleviating Factors: Compliance       Side Effects  none    Current diet  in general, a \"healthy\" diet      Current exercise walking    Current monitoring regimen: home blood tests - rarely checking    Hypoglycemia none      PE    /58   Pulse 59   Ht 170.2 cm (67\")   Wt 97.5 kg (215 lb)   SpO2 98%   BMI 33.67 kg/m²   AOx3  No visible goiter  RRR  CTA  No Edema    Labs    Lab Results   Component Value Date    WBC 5.86 03/03/2021    HGB 14.5 03/03/2021    HCT 44.3 03/03/2021    MCV 89.7 03/03/2021     03/03/2021     Lab Results   Component Value Date    GLUCOSE 359 (H) 03/03/2021    BUN 34 (H) 03/03/2021    CREATININE 1.19 (H) 03/03/2021    EGFRIFAFRI 55 (L) 03/03/2021    BCR 28.6 (H) 03/03/2021    K 4.0 03/03/2021    CO2 24.7 03/03/2021    CALCIUM 9.7 03/03/2021    ALBUMIN 4.10 03/03/2021    AST 11 03/03/2021    ALT 11 03/03/2021         Assessment/Plan       ICD-10-CM ICD-9-CM   1. Type 2 diabetes mellitus with hyperglycemia, with long-term current use of insulin (CMS/[facility])  E11.65 250.00    Z79.4 790.29     V58.67   2. Hypertension associated with diabetes (CMS/[facility])  E11.59 250.80    I15.2 401.9   3. Mixed diabetic hyperlipidemia associated with type 2 diabetes mellitus (CMS/[facility])  E11.69 250.80    E78.2 272.2         I reviewed and summarized records from Isauro Prado MD from current year  and I reviewed / ordered labs.   From review of records :    Pt has type 2 diabetes , uncontrolled, last HgbA1c 13         Glycemic Management:   Lab Results   Component Value Date    HGBA1C 11.98 (H) 03/03/2021    HGBA1C 11.2 12/03/2020     Lab Results   Component Value Date    GLUCOSE 359 (H) 03/03/2021    " "BUN 34 (H) 03/03/2021    CREATININE 1.19 (H) 03/03/2021    EGFRIFAFRI 55 (L) 03/03/2021    BCR 28.6 (H) 03/03/2021    K 4.0 03/03/2021    CO2 24.7 03/03/2021    CALCIUM 9.7 03/03/2021    ALBUMIN 4.10 03/03/2021    AST 11 03/03/2021    ALT 11 03/03/2021    ANIONGAP 12.3 03/03/2021     Lab Results   Component Value Date    WBC 5.86 03/03/2021    HGB 14.5 03/03/2021    HCT 44.3 03/03/2021    MCV 89.7 03/03/2021     03/03/2021             soliqua 46 am and 10 pm change to 60 am only   If low overnight ,decrease to 500   =================     synjardy one tablet with the first meal of the day   10/1000    ==========    Fiasp     About 10 for meals     10 to 30   Need to check after supper to prove less than 180         Patrick too expensive       --    Dyslipidemia on lipitor     Lab Results   Component Value Date    CHOL 120 03/03/2021    TRIG 101 03/03/2021    HDL 48 03/03/2021    LDL 53 03/03/2021       --    HTN controlled on lisinopril regimen   /58   Pulse 59   Ht 170.2 cm (67\")   Wt 97.5 kg (215 lb)   SpO2 98%   BMI 33.67 kg/m²         No orders of the defined types were placed in this encounter.        A copy of my note was sent to Isauro Prado MD    Please see my above opinion and suggestions.           This document has been electronically signed by Efren Crow MD on June 7, 2021 08:23 CDT    "

## 2021-06-07 NOTE — PATIENT INSTRUCTIONS
Synjardy , 1 w bkfast    Synjardy 60 before bkfast. If you ever wake with a sugar reading less than 100 --- back off by 5 units    Fiasp    Take 10 to 20 to 30  units before supper  Your goal is that 2 hours after supper your sugar should be under 180   If not then next time eat less or take more

## 2021-08-30 RX ORDER — PEN NEEDLE, DIABETIC 32GX 5/32"
NEEDLE, DISPOSABLE MISCELLANEOUS
Qty: 100 EACH | Refills: 0 | Status: SHIPPED | OUTPATIENT
Start: 2021-08-30 | End: 2021-11-15

## 2021-09-02 ENCOUNTER — LAB (OUTPATIENT)
Dept: LAB | Facility: HOSPITAL | Age: 66
End: 2021-09-02

## 2021-09-02 LAB
25(OH)D3 SERPL-MCNC: 42.3 NG/ML
ALBUMIN SERPL-MCNC: 3.8 G/DL (ref 3.5–5.2)
ALBUMIN UR-MCNC: 3.6 MG/DL
ALBUMIN/GLOB SERPL: 1.4 G/DL
ALP SERPL-CCNC: 114 U/L (ref 39–117)
ALT SERPL W P-5'-P-CCNC: 14 U/L (ref 1–33)
ANION GAP SERPL CALCULATED.3IONS-SCNC: 8.7 MMOL/L (ref 5–15)
AST SERPL-CCNC: 12 U/L (ref 1–32)
BASOPHILS # BLD AUTO: 0.06 10*3/MM3 (ref 0–0.2)
BASOPHILS NFR BLD AUTO: 0.9 % (ref 0–1.5)
BILIRUB SERPL-MCNC: 0.4 MG/DL (ref 0–1.2)
BUN SERPL-MCNC: 36 MG/DL (ref 8–23)
BUN/CREAT SERPL: 26.9 (ref 7–25)
CALCIUM SPEC-SCNC: 9.1 MG/DL (ref 8.6–10.5)
CHLORIDE SERPL-SCNC: 107 MMOL/L (ref 98–107)
CHOLEST SERPL-MCNC: 130 MG/DL (ref 0–200)
CO2 SERPL-SCNC: 23.3 MMOL/L (ref 22–29)
CREAT SERPL-MCNC: 1.34 MG/DL (ref 0.57–1)
CREAT UR-MCNC: 63.6 MG/DL
DEPRECATED RDW RBC AUTO: 45.6 FL (ref 37–54)
EOSINOPHIL # BLD AUTO: 0.29 10*3/MM3 (ref 0–0.4)
EOSINOPHIL NFR BLD AUTO: 4.5 % (ref 0.3–6.2)
ERYTHROCYTE [DISTWIDTH] IN BLOOD BY AUTOMATED COUNT: 13.9 % (ref 12.3–15.4)
GFR SERPL CREATININE-BSD FRML MDRD: 48 ML/MIN/1.73
GLOBULIN UR ELPH-MCNC: 2.7 GM/DL
GLUCOSE SERPL-MCNC: 109 MG/DL (ref 65–99)
HBA1C MFR BLD: 8.43 % (ref 4.8–5.6)
HCT VFR BLD AUTO: 40.6 % (ref 34–46.6)
HDLC SERPL-MCNC: 44 MG/DL (ref 40–60)
HGB BLD-MCNC: 13.5 G/DL (ref 12–15.9)
IMM GRANULOCYTES # BLD AUTO: 0.05 10*3/MM3 (ref 0–0.05)
IMM GRANULOCYTES NFR BLD AUTO: 0.8 % (ref 0–0.5)
LDLC SERPL CALC-MCNC: 66 MG/DL (ref 0–100)
LDLC/HDLC SERPL: 1.45 {RATIO}
LYMPHOCYTES # BLD AUTO: 2.69 10*3/MM3 (ref 0.7–3.1)
LYMPHOCYTES NFR BLD AUTO: 41.6 % (ref 19.6–45.3)
MCH RBC QN AUTO: 29.7 PG (ref 26.6–33)
MCHC RBC AUTO-ENTMCNC: 33.3 G/DL (ref 31.5–35.7)
MCV RBC AUTO: 89.2 FL (ref 79–97)
MICROALBUMIN/CREAT UR: 56.6 MG/G
MONOCYTES # BLD AUTO: 0.6 10*3/MM3 (ref 0.1–0.9)
MONOCYTES NFR BLD AUTO: 9.3 % (ref 5–12)
NEUTROPHILS NFR BLD AUTO: 2.77 10*3/MM3 (ref 1.7–7)
NEUTROPHILS NFR BLD AUTO: 42.9 % (ref 42.7–76)
NRBC BLD AUTO-RTO: 0 /100 WBC (ref 0–0.2)
PLATELET # BLD AUTO: 234 10*3/MM3 (ref 140–450)
PMV BLD AUTO: 12.9 FL (ref 6–12)
POTASSIUM SERPL-SCNC: 4.2 MMOL/L (ref 3.5–5.2)
PROT SERPL-MCNC: 6.5 G/DL (ref 6–8.5)
RBC # BLD AUTO: 4.55 10*6/MM3 (ref 3.77–5.28)
SODIUM SERPL-SCNC: 139 MMOL/L (ref 136–145)
TRIGL SERPL-MCNC: 110 MG/DL (ref 0–150)
TSH SERPL DL<=0.05 MIU/L-ACNC: 1.54 UIU/ML (ref 0.27–4.2)
VIT B12 BLD-MCNC: 748 PG/ML (ref 211–946)
VLDLC SERPL-MCNC: 20 MG/DL (ref 5–40)
WBC # BLD AUTO: 6.46 10*3/MM3 (ref 3.4–10.8)

## 2021-09-02 PROCEDURE — 80053 COMPREHEN METABOLIC PANEL: CPT | Performed by: INTERNAL MEDICINE

## 2021-09-02 PROCEDURE — 85025 COMPLETE CBC W/AUTO DIFF WBC: CPT | Performed by: INTERNAL MEDICINE

## 2021-09-02 PROCEDURE — 83036 HEMOGLOBIN GLYCOSYLATED A1C: CPT | Performed by: INTERNAL MEDICINE

## 2021-09-02 PROCEDURE — 84443 ASSAY THYROID STIM HORMONE: CPT | Performed by: INTERNAL MEDICINE

## 2021-09-02 PROCEDURE — 82607 VITAMIN B-12: CPT | Performed by: INTERNAL MEDICINE

## 2021-09-02 PROCEDURE — 80061 LIPID PANEL: CPT | Performed by: INTERNAL MEDICINE

## 2021-09-02 PROCEDURE — 82043 UR ALBUMIN QUANTITATIVE: CPT | Performed by: INTERNAL MEDICINE

## 2021-09-02 PROCEDURE — 82570 ASSAY OF URINE CREATININE: CPT | Performed by: INTERNAL MEDICINE

## 2021-09-02 PROCEDURE — 82306 VITAMIN D 25 HYDROXY: CPT | Performed by: INTERNAL MEDICINE

## 2021-09-07 ENCOUNTER — OFFICE VISIT (OUTPATIENT)
Dept: ENDOCRINOLOGY | Facility: CLINIC | Age: 66
End: 2021-09-07

## 2021-09-07 ENCOUNTER — TELEPHONE (OUTPATIENT)
Dept: ENDOCRINOLOGY | Facility: CLINIC | Age: 66
End: 2021-09-07

## 2021-09-07 VITALS
DIASTOLIC BLOOD PRESSURE: 70 MMHG | OXYGEN SATURATION: 98 % | SYSTOLIC BLOOD PRESSURE: 120 MMHG | BODY MASS INDEX: 32.18 KG/M2 | HEART RATE: 55 BPM | WEIGHT: 205 LBS | HEIGHT: 67 IN

## 2021-09-07 DIAGNOSIS — E78.2 MIXED DIABETIC HYPERLIPIDEMIA ASSOCIATED WITH TYPE 2 DIABETES MELLITUS (HCC): ICD-10-CM

## 2021-09-07 DIAGNOSIS — E11.69 MIXED DIABETIC HYPERLIPIDEMIA ASSOCIATED WITH TYPE 2 DIABETES MELLITUS (HCC): ICD-10-CM

## 2021-09-07 DIAGNOSIS — E11.59 HYPERTENSION ASSOCIATED WITH DIABETES (HCC): ICD-10-CM

## 2021-09-07 DIAGNOSIS — E55.9 VITAMIN D DEFICIENCY: ICD-10-CM

## 2021-09-07 DIAGNOSIS — E11.65 TYPE 2 DIABETES MELLITUS WITH HYPERGLYCEMIA, WITH LONG-TERM CURRENT USE OF INSULIN (HCC): Primary | ICD-10-CM

## 2021-09-07 DIAGNOSIS — I15.2 HYPERTENSION ASSOCIATED WITH DIABETES (HCC): ICD-10-CM

## 2021-09-07 DIAGNOSIS — N18.31 CHRONIC KIDNEY DISEASE, STAGE 3A (HCC): ICD-10-CM

## 2021-09-07 DIAGNOSIS — Z79.4 TYPE 2 DIABETES MELLITUS WITH HYPERGLYCEMIA, WITH LONG-TERM CURRENT USE OF INSULIN (HCC): Primary | ICD-10-CM

## 2021-09-07 PROCEDURE — 99214 OFFICE O/P EST MOD 30 MIN: CPT | Performed by: INTERNAL MEDICINE

## 2021-09-07 NOTE — PROGRESS NOTES
" Ellen Storey is a 66 y.o. female who presents for  evaluation of diabetes       HPI    Duration 10 years     Quality -  poorly controlled  When we met w improvement, Aic from 12 to 8s     Severity -  severe    Complications - retinopathy , ckd stage III    Current symptoms/problems  none     Alleviating Factors: Compliance       Side Effects  none    Current diet  in general, a \"healthy\" diet      Current exercise walking    Current monitoring regimen: home blood tests - rarely checking    Hypoglycemia none      PE    /70   Pulse 55   Ht 170.2 cm (67\")   Wt 93 kg (205 lb)   SpO2 98%   BMI 32.11 kg/m²   AOx3  No visible goiter  RRR  CTA  No Edema    Labs    Lab Results   Component Value Date    WBC 6.46 09/02/2021    HGB 13.5 09/02/2021    HCT 40.6 09/02/2021    MCV 89.2 09/02/2021     09/02/2021     Lab Results   Component Value Date    GLUCOSE 109 (H) 09/02/2021    BUN 36 (H) 09/02/2021    CREATININE 1.34 (H) 09/02/2021    EGFRIFAFRI 48 (L) 09/02/2021    BCR 26.9 (H) 09/02/2021    K 4.2 09/02/2021    CO2 23.3 09/02/2021    CALCIUM 9.1 09/02/2021    ALBUMIN 3.80 09/02/2021    AST 12 09/02/2021    ALT 14 09/02/2021         Assessment/Plan       ICD-10-CM ICD-9-CM   1. Type 2 diabetes mellitus with hyperglycemia, with long-term current use of insulin (CMS/[facility])  E11.65 250.00    Z79.4 790.29     V58.67   2. Hypertension associated with diabetes (CMS/[facility])  E11.59 250.80    I15.2 401.9   3. Mixed diabetic hyperlipidemia associated with type 2 diabetes mellitus (CMS/[facility])  E11.69 250.80    E78.2 272.2   4. Vitamin D deficiency  E55.9 268.9         I reviewed and summarized records from Isauro Prado MD from current year  and I reviewed / ordered labs.   From review of records :    Pt has type 2 diabetes , uncontrolled, last HgbA1c 13         Glycemic Management:   Lab Results   Component Value Date    HGBA1C 8.43 (H) 09/02/2021    HGBA1C 11.98 (H) 03/03/2021    HGBA1C 11.2 12/03/2020     Lab Results " "  Component Value Date    GLUCOSE 109 (H) 09/02/2021    BUN 36 (H) 09/02/2021    CREATININE 1.34 (H) 09/02/2021    EGFRIFAFRI 48 (L) 09/02/2021    BCR 26.9 (H) 09/02/2021    K 4.2 09/02/2021    CO2 23.3 09/02/2021    CALCIUM 9.1 09/02/2021    ALBUMIN 3.80 09/02/2021    AST 12 09/02/2021    ALT 14 09/02/2021    ANIONGAP 8.7 09/02/2021     Lab Results   Component Value Date    WBC 6.46 09/02/2021    HGB 13.5 09/02/2021    HCT 40.6 09/02/2021    MCV 89.2 09/02/2021     09/02/2021             soliqua 60 am       If low overnight ,decrease to 50  =================     synjardy one tablet with the first meal of the day   10/1000    ==========    Fiasp     About 10 for meals     10 to 30   Need to check after supper to prove less than 180     Doing about 20         Patrick too expensive       --    Dyslipidemia on lipitor     Lab Results   Component Value Date    CHOL 130 09/02/2021    TRIG 110 09/02/2021    HDL 44 09/02/2021    LDL 66 09/02/2021       --    HTN controlled on lisinopril regimen   /70   Pulse 55   Ht 170.2 cm (67\")   Wt 93 kg (205 lb)   SpO2 98%   BMI 32.11 kg/m²     --    ckd stage 3aA2    No orders of the defined types were placed in this encounter.        A copy of my note was sent to Isauro Prado MD    Please see my above opinion and suggestions.           This document has been electronically signed by Efren Crow MD on September 7, 2021 09:31 CDT    "

## 2021-09-08 ENCOUNTER — DOCUMENTATION (OUTPATIENT)
Dept: ENDOCRINOLOGY | Facility: CLINIC | Age: 66
End: 2021-09-08

## 2021-09-14 ENCOUNTER — DOCUMENTATION (OUTPATIENT)
Dept: ENDOCRINOLOGY | Facility: CLINIC | Age: 66
End: 2021-09-14

## 2021-09-20 ENCOUNTER — TELEPHONE (OUTPATIENT)
Dept: ENDOCRINOLOGY | Facility: CLINIC | Age: 66
End: 2021-09-20

## 2021-09-20 NOTE — TELEPHONE ENCOUNTER
Pt says we need to get a NEYDA beltran  And uses [facility] in [place] on [address]   [phone]

## 2021-09-21 RX ORDER — INSULIN GLARGINE AND LIXISENATIDE 100; 33 U/ML; UG/ML
INJECTION, SOLUTION SUBCUTANEOUS
Qty: 15 ML | Refills: 0 | Status: SHIPPED | OUTPATIENT
Start: 2021-09-21 | End: 2021-10-20

## 2021-09-21 RX ORDER — EMPAGLIFLOZIN, METFORMIN HYDROCHLORIDE 10; 1000 MG/1; MG/1
TABLET, EXTENDED RELEASE ORAL
Qty: 30 TABLET | Refills: 0 | Status: SHIPPED | OUTPATIENT
Start: 2021-09-21 | End: 2021-10-20

## 2021-09-22 ENCOUNTER — TELEPHONE (OUTPATIENT)
Dept: ENDOCRINOLOGY | Facility: CLINIC | Age: 66
End: 2021-09-22

## 2021-09-22 NOTE — TELEPHONE ENCOUNTER
PT CALLED TO ASK ABOUT RX FOR   KERENDIA 10MG    IS HAVING ISSUE GETTING THIS FILLED. I DO NOT SEE THIS IN THE PT MED LIST.    PLEASE CONTACT PT WITH NEXT STEPS.

## 2021-09-23 RX ORDER — PROCHLORPERAZINE 25 MG/1
1 SUPPOSITORY RECTAL ONCE
Qty: 1 EACH | Refills: 3 | Status: SHIPPED | OUTPATIENT
Start: 2021-09-23 | End: 2021-09-23

## 2021-09-23 RX ORDER — PROCHLORPERAZINE 25 MG/1
SUPPOSITORY RECTAL AS NEEDED
Qty: 9 EACH | Refills: 3 | Status: SHIPPED | OUTPATIENT
Start: 2021-09-23 | End: 2021-12-09

## 2021-09-23 RX ORDER — PROCHLORPERAZINE 25 MG/1
1 SUPPOSITORY RECTAL ONCE
Qty: 1 EACH | Refills: 1 | Status: SHIPPED | OUTPATIENT
Start: 2021-09-23 | End: 2021-09-23

## 2021-09-27 ENCOUNTER — DOCUMENTATION (OUTPATIENT)
Dept: ENDOCRINOLOGY | Facility: CLINIC | Age: 66
End: 2021-09-27

## 2021-09-27 NOTE — PROGRESS NOTES
Ellen Storey (Key: [id number]) - [id number]  FreeStyle Patrick 2 Sensor       Status: PA Request  SENT VIA CMM

## 2021-09-28 ENCOUNTER — DOCUMENTATION (OUTPATIENT)
Dept: ENDOCRINOLOGY | Facility: CLINIC | Age: 66
End: 2021-09-28

## 2021-10-20 RX ORDER — EMPAGLIFLOZIN, METFORMIN HYDROCHLORIDE 10; 1000 MG/1; MG/1
TABLET, EXTENDED RELEASE ORAL
Qty: 30 TABLET | Refills: 0 | Status: SHIPPED | OUTPATIENT
Start: 2021-10-20 | End: 2021-12-06

## 2021-10-20 RX ORDER — INSULIN GLARGINE AND LIXISENATIDE 100; 33 U/ML; UG/ML
INJECTION, SOLUTION SUBCUTANEOUS
Qty: 15 ML | Refills: 0 | Status: SHIPPED | OUTPATIENT
Start: 2021-10-20 | End: 2021-11-15

## 2021-11-15 RX ORDER — INSULIN GLARGINE AND LIXISENATIDE 100; 33 U/ML; UG/ML
INJECTION, SOLUTION SUBCUTANEOUS
Qty: 15 ML | Refills: 0 | Status: SHIPPED | OUTPATIENT
Start: 2021-11-15 | End: 2021-12-13

## 2021-11-15 RX ORDER — PEN NEEDLE, DIABETIC 32GX 5/32"
NEEDLE, DISPOSABLE MISCELLANEOUS
Qty: 100 EACH | Refills: 0 | Status: SHIPPED | OUTPATIENT
Start: 2021-11-15 | End: 2022-01-06

## 2021-12-06 ENCOUNTER — LAB (OUTPATIENT)
Dept: LAB | Facility: HOSPITAL | Age: 66
End: 2021-12-06

## 2021-12-06 LAB
25(OH)D3 SERPL-MCNC: 42.8 NG/ML
ALBUMIN SERPL-MCNC: 3.9 G/DL (ref 3.5–5.2)
ALBUMIN UR-MCNC: 6.5 MG/DL
ALBUMIN/GLOB SERPL: 1.3 G/DL
ALP SERPL-CCNC: 111 U/L (ref 39–117)
ALT SERPL W P-5'-P-CCNC: 20 U/L (ref 1–33)
ANION GAP SERPL CALCULATED.3IONS-SCNC: 10.5 MMOL/L (ref 5–15)
AST SERPL-CCNC: 17 U/L (ref 1–32)
BASOPHILS # BLD AUTO: 0.06 10*3/MM3 (ref 0–0.2)
BASOPHILS NFR BLD AUTO: 1.1 % (ref 0–1.5)
BILIRUB SERPL-MCNC: 0.4 MG/DL (ref 0–1.2)
BUN SERPL-MCNC: 39 MG/DL (ref 8–23)
BUN/CREAT SERPL: 33.1 (ref 7–25)
CALCIUM SPEC-SCNC: 9.4 MG/DL (ref 8.6–10.5)
CHLORIDE SERPL-SCNC: 107 MMOL/L (ref 98–107)
CHOLEST SERPL-MCNC: 140 MG/DL (ref 0–200)
CO2 SERPL-SCNC: 23.5 MMOL/L (ref 22–29)
CREAT SERPL-MCNC: 1.18 MG/DL (ref 0.57–1)
CREAT UR-MCNC: 69.1 MG/DL
DEPRECATED RDW RBC AUTO: 41.8 FL (ref 37–54)
EOSINOPHIL # BLD AUTO: 0.26 10*3/MM3 (ref 0–0.4)
EOSINOPHIL NFR BLD AUTO: 5 % (ref 0.3–6.2)
ERYTHROCYTE [DISTWIDTH] IN BLOOD BY AUTOMATED COUNT: 13.1 % (ref 12.3–15.4)
GFR SERPL CREATININE-BSD FRML MDRD: 55 ML/MIN/1.73
GLOBULIN UR ELPH-MCNC: 2.9 GM/DL
GLUCOSE SERPL-MCNC: 156 MG/DL (ref 65–99)
HBA1C MFR BLD: 8.2 % (ref 4.8–5.6)
HCT VFR BLD AUTO: 41.3 % (ref 34–46.6)
HDLC SERPL-MCNC: 42 MG/DL (ref 40–60)
HGB BLD-MCNC: 14 G/DL (ref 12–15.9)
LDLC SERPL CALC-MCNC: 73 MG/DL (ref 0–100)
LDLC/HDLC SERPL: 1.65 {RATIO}
LYMPHOCYTES # BLD AUTO: 2.33 10*3/MM3 (ref 0.7–3.1)
LYMPHOCYTES NFR BLD AUTO: 44.4 % (ref 19.6–45.3)
MCH RBC QN AUTO: 29.6 PG (ref 26.6–33)
MCHC RBC AUTO-ENTMCNC: 33.9 G/DL (ref 31.5–35.7)
MCV RBC AUTO: 87.3 FL (ref 79–97)
MICROALBUMIN/CREAT UR: 94.1 MG/G
MONOCYTES # BLD AUTO: 0.52 10*3/MM3 (ref 0.1–0.9)
MONOCYTES NFR BLD AUTO: 9.9 % (ref 5–12)
NEUTROPHILS NFR BLD AUTO: 2.06 10*3/MM3 (ref 1.7–7)
NEUTROPHILS NFR BLD AUTO: 39.2 % (ref 42.7–76)
PLATELET # BLD AUTO: 218 10*3/MM3 (ref 140–450)
PMV BLD AUTO: 13.1 FL (ref 6–12)
POTASSIUM SERPL-SCNC: 4.8 MMOL/L (ref 3.5–5.2)
PROT SERPL-MCNC: 6.8 G/DL (ref 6–8.5)
RBC # BLD AUTO: 4.73 10*6/MM3 (ref 3.77–5.28)
SODIUM SERPL-SCNC: 141 MMOL/L (ref 136–145)
TRIGL SERPL-MCNC: 144 MG/DL (ref 0–150)
TSH SERPL DL<=0.05 MIU/L-ACNC: 0.8 UIU/ML (ref 0.27–4.2)
VIT B12 BLD-MCNC: 718 PG/ML (ref 211–946)
VLDLC SERPL-MCNC: 25 MG/DL (ref 5–40)
WBC NRBC COR # BLD: 5.25 10*3/MM3 (ref 3.4–10.8)

## 2021-12-06 PROCEDURE — 83036 HEMOGLOBIN GLYCOSYLATED A1C: CPT | Performed by: INTERNAL MEDICINE

## 2021-12-06 PROCEDURE — 85025 COMPLETE CBC W/AUTO DIFF WBC: CPT | Performed by: INTERNAL MEDICINE

## 2021-12-06 PROCEDURE — 84443 ASSAY THYROID STIM HORMONE: CPT | Performed by: INTERNAL MEDICINE

## 2021-12-06 PROCEDURE — 82306 VITAMIN D 25 HYDROXY: CPT | Performed by: INTERNAL MEDICINE

## 2021-12-06 PROCEDURE — 82570 ASSAY OF URINE CREATININE: CPT | Performed by: INTERNAL MEDICINE

## 2021-12-06 PROCEDURE — 80053 COMPREHEN METABOLIC PANEL: CPT | Performed by: INTERNAL MEDICINE

## 2021-12-06 PROCEDURE — 82607 VITAMIN B-12: CPT | Performed by: INTERNAL MEDICINE

## 2021-12-06 PROCEDURE — 80061 LIPID PANEL: CPT | Performed by: INTERNAL MEDICINE

## 2021-12-06 PROCEDURE — 82043 UR ALBUMIN QUANTITATIVE: CPT | Performed by: INTERNAL MEDICINE

## 2021-12-06 RX ORDER — INSULIN ASPART INJECTION 100 [IU]/ML
INJECTION, SOLUTION SUBCUTANEOUS
Qty: 15 ML | Refills: 0 | Status: SHIPPED | OUTPATIENT
Start: 2021-12-06 | End: 2022-02-10

## 2021-12-06 RX ORDER — EMPAGLIFLOZIN, METFORMIN HYDROCHLORIDE 10; 1000 MG/1; MG/1
TABLET, EXTENDED RELEASE ORAL
Qty: 30 TABLET | Refills: 0 | Status: SHIPPED | OUTPATIENT
Start: 2021-12-06 | End: 2021-12-13 | Stop reason: SDUPTHER

## 2021-12-09 ENCOUNTER — SPECIALTY PHARMACY (OUTPATIENT)
Dept: ENDOCRINOLOGY | Facility: CLINIC | Age: 66
End: 2021-12-09

## 2021-12-09 ENCOUNTER — OFFICE VISIT (OUTPATIENT)
Dept: ENDOCRINOLOGY | Facility: CLINIC | Age: 66
End: 2021-12-09

## 2021-12-09 VITALS
BODY MASS INDEX: 35.63 KG/M2 | WEIGHT: 227 LBS | HEIGHT: 67 IN | DIASTOLIC BLOOD PRESSURE: 70 MMHG | SYSTOLIC BLOOD PRESSURE: 120 MMHG | OXYGEN SATURATION: 99 % | HEART RATE: 65 BPM

## 2021-12-09 DIAGNOSIS — E11.65 TYPE 2 DIABETES MELLITUS WITH HYPERGLYCEMIA, WITH LONG-TERM CURRENT USE OF INSULIN (HCC): Primary | ICD-10-CM

## 2021-12-09 DIAGNOSIS — E55.9 VITAMIN D DEFICIENCY: ICD-10-CM

## 2021-12-09 DIAGNOSIS — N18.31 CHRONIC KIDNEY DISEASE, STAGE 3A (HCC): ICD-10-CM

## 2021-12-09 DIAGNOSIS — E78.2 MIXED DIABETIC HYPERLIPIDEMIA ASSOCIATED WITH TYPE 2 DIABETES MELLITUS (HCC): ICD-10-CM

## 2021-12-09 DIAGNOSIS — Z79.4 TYPE 2 DIABETES MELLITUS WITH HYPERGLYCEMIA, WITH LONG-TERM CURRENT USE OF INSULIN (HCC): Primary | ICD-10-CM

## 2021-12-09 DIAGNOSIS — E11.59 HYPERTENSION ASSOCIATED WITH DIABETES (HCC): ICD-10-CM

## 2021-12-09 DIAGNOSIS — E11.69 MIXED DIABETIC HYPERLIPIDEMIA ASSOCIATED WITH TYPE 2 DIABETES MELLITUS (HCC): ICD-10-CM

## 2021-12-09 DIAGNOSIS — I15.2 HYPERTENSION ASSOCIATED WITH DIABETES (HCC): ICD-10-CM

## 2021-12-09 PROCEDURE — 99214 OFFICE O/P EST MOD 30 MIN: CPT | Performed by: INTERNAL MEDICINE

## 2021-12-09 RX ORDER — HYDROCHLOROTHIAZIDE 25 MG/1
25 TABLET ORAL DAILY
COMMUNITY

## 2021-12-09 NOTE — PROGRESS NOTES
" Ellen Storey is a 66 y.o. female who presents for  evaluation of diabetes       HPI    Duration 10 years     Quality -  poorly controlled  When we met w improvement, Aic from 12 to 8s     Severity -  severe    Complications - retinopathy , ckd stage III    Current symptoms/problems  none     Alleviating Factors: Compliance       Side Effects  none    Current diet  in general, a \"healthy\" diet      Current exercise walking    Current monitoring regimen: home blood tests - rarely checking    Hypoglycemia none      PE    /70   Pulse 65   Ht 170.2 cm (67\")   Wt 103 kg (227 lb)   SpO2 99%   BMI 35.55 kg/m²   AOx3  No visible goiter  RRR  CTA  No Edema    Labs    Lab Results   Component Value Date    WBC 5.25 12/06/2021    HGB 14.0 12/06/2021    HCT 41.3 12/06/2021    MCV 87.3 12/06/2021     12/06/2021     Lab Results   Component Value Date    GLUCOSE 156 (H) 12/06/2021    BUN 39 (H) 12/06/2021    CREATININE 1.18 (H) 12/06/2021    EGFRIFAFRI 55 (L) 12/06/2021    BCR 33.1 (H) 12/06/2021    K 4.8 12/06/2021    CO2 23.5 12/06/2021    CALCIUM 9.4 12/06/2021    ALBUMIN 3.90 12/06/2021    AST 17 12/06/2021    ALT 20 12/06/2021         Assessment/Plan       ICD-10-CM ICD-9-CM   1. Type 2 diabetes mellitus with hyperglycemia, with long-term current use of insulin ([facility])  E11.65 250.00    Z79.4 790.29     V58.67   2. Hypertension associated with diabetes ([facility])  E11.59 250.80    I15.2 401.9   3. Mixed diabetic hyperlipidemia associated with type 2 diabetes mellitus ([facility])  E11.69 250.80    E78.2 272.2   4. Chronic kidney disease, stage 3a ([facility])  N18.31 585.3   5. Vitamin D deficiency  E55.9 268.9         I reviewed and summarized records from Isauro Prado MD from current year  and I reviewed / ordered labs.   From review of records :    Pt has type 2 diabetes , improved         Glycemic Management:   Lab Results   Component Value Date    HGBA1C 8.20 (H) 12/06/2021    HGBA1C 8.43 (H) 09/02/2021    BA1C 11.98 " "(H) 03/03/2021     Lab Results   Component Value Date    GLUCOSE 156 (H) 12/06/2021    BUN 39 (H) 12/06/2021    CREATININE 1.18 (H) 12/06/2021    EGFRIFAFRI 55 (L) 12/06/2021    BCR 33.1 (H) 12/06/2021    K 4.8 12/06/2021    CO2 23.5 12/06/2021    CALCIUM 9.4 12/06/2021    ALBUMIN 3.90 12/06/2021    AST 17 12/06/2021    ALT 20 12/06/2021    ANIONGAP 10.5 12/06/2021     Lab Results   Component Value Date    WBC 5.25 12/06/2021    HGB 14.0 12/06/2021    HCT 41.3 12/06/2021    MCV 87.3 12/06/2021     12/06/2021             soliqua 55am   =================     synjardy one tablet with the first meal of the day   10/1000    ==========    Fiasp     About 10 for meals     10 to 30   Need to check after supper to prove less than 180     Doing about 20         Patrick too expensive , try again       --    Dyslipidemia on lipitor     Lab Results   Component Value Date    CHOL 140 12/06/2021    TRIG 144 12/06/2021    HDL 42 12/06/2021    LDL 73 12/06/2021       --    HTN controlled on lisinopril regimen   /70   Pulse 65   Ht 170.2 cm (67\")   Wt 103 kg (227 lb)   SpO2 99%   BMI 35.55 kg/m²     --    ckd stage 3aA2    On lisinopril 40 and jardiance    Add kerendia. Couldn't get through [facility] , we will ask our pharmacy to take over     No orders of the defined types were placed in this encounter.        A copy of my note was sent to Isauro Prado MD    Please see my above opinion and suggestions.           This document has been electronically signed by Efren Crow MD on December 9, 2021 08:52 CST    "

## 2021-12-13 ENCOUNTER — TELEPHONE (OUTPATIENT)
Dept: ENDOCRINOLOGY | Facility: CLINIC | Age: 66
End: 2021-12-13

## 2021-12-13 DIAGNOSIS — Z79.4 TYPE 2 DIABETES MELLITUS WITH HYPERGLYCEMIA, WITH LONG-TERM CURRENT USE OF INSULIN (HCC): Primary | ICD-10-CM

## 2021-12-13 DIAGNOSIS — E11.65 TYPE 2 DIABETES MELLITUS WITH HYPERGLYCEMIA, WITH LONG-TERM CURRENT USE OF INSULIN (HCC): Primary | ICD-10-CM

## 2021-12-13 RX ORDER — INSULIN GLARGINE AND LIXISENATIDE 100; 33 U/ML; UG/ML
INJECTION, SOLUTION SUBCUTANEOUS
Qty: 15 ML | Refills: 6 | Status: SHIPPED | OUTPATIENT
Start: 2021-12-13 | End: 2022-09-16 | Stop reason: SDUPTHER

## 2021-12-13 RX ORDER — EMPAGLIFLOZIN, METFORMIN HYDROCHLORIDE 10; 1000 MG/1; MG/1
1 TABLET, EXTENDED RELEASE ORAL
Qty: 30 TABLET | Refills: 6 | Status: SHIPPED | OUTPATIENT
Start: 2021-12-13 | End: 2022-08-03

## 2021-12-13 NOTE — PROGRESS NOTES
Specialty Pharmacy Patient Management Program  Endocrinology Initial Assessment     Ellen Storey is a 66 y.o. female with Type 2 Diabetes seen by an Endocrinology provider and enrolled in the Endocrinology Patient Management program offered by [facility].  An initial outreach was conducted, including assessment of therapy appropriateness and specialty medication education for Kerendia. The patient was introduced to services offered by [facility], including: regular assessments, refill coordination, curbside pick-up or mail order delivery options, prior authorization maintenance, and financial assistance programs as applicable. The patient was also provided with contact information for the pharmacy team.     Insurance Coverage & Financial Support  Commercial and medicare    Relevant Past Medical History and Comorbidities  Relevant medical history and concomitant health conditions were discussed with the patient. The patient's chart has been reviewed for relevant past medical history and comorbid health conditions and updated as necessary.   Past Medical History:   Diagnosis Date   • Diabetes mellitus (HCC)    • Hypertension    • Obesity, Class II, BMI 35-39.9 11/29/2017   • POP-Q stage 3 cystocele 11/29/2017   • Uterine procidentia 11/29/2017     Social History     Socioeconomic History   • Marital status: Single   Tobacco Use   • Smoking status: Former Smoker   • Smokeless tobacco: Former User   Substance and Sexual Activity   • Alcohol use: No   • Drug use: No   • Sexual activity: Never     Birth control/protection: Post-menopausal       Allergies  Known allergies and reactions were discussed with the patient. The patient's chart has been reviewed for  allergy information and updated as necessary.   Patient has no known allergies.    Current Medication List  This medication list has been reviewed with the patient and evaluated for any interactions or necessary  modifications/recommendations, and updated to include all prescription medications, OTC medications, and supplements the patient is currently taking.  This list reflects what is contained in the patient's profile, which has also been marked as reviewed to communicate to other providers it is the most up to date version of the patient's current medication therapy.     Current Outpatient Medications:   •  atorvastatin (LIPITOR) 40 MG tablet, , Disp: , Rfl:   •  BD Pen Needle Ailyn 2nd Gen 32G X 4 MM misc, USE 1 PEN NEEDLE  SUBCUTANEOUSLY 4 TIMES DAILY, Disp: 100 each, Rfl: 0  •  Continuous Blood Gluc Sensor (FreeStyle Patrick 2 Sensor) misc, Use as directed for continuous glucose monitoring **Change sensor Every 14 (Fourteen) Days**, Disp: 2 each, Rfl: 11  •  diltiaZEM (CARDIZEM) 120 MG tablet, , Disp: , Rfl:   •  Finerenone 10 MG tablet, Take 1 tablet by mouth Daily., Disp: 30 tablet, Rfl: 11  •  hydroCHLOROthiazide (HYDRODIURIL) 25 MG tablet, Take 25 mg by mouth Daily. Takes  1/2 daily, Disp: , Rfl:   •  Insulin aspart (FIASP FLEXTOUCH) 100 UNIT/ML solution pen-injector injection pen, INJECT 2 TO 10 UNITS SUBCUTANEOUSLY WITH MEALS, Disp: 15 mL, Rfl: 0  •  lisinopril (PRINIVIL,ZESTRIL) 40 MG tablet, , Disp: , Rfl:   •  promethazine (PHENERGAN) 12.5 MG tablet, Take 1 tablet by mouth Every 6 (Six) Hours As Needed for Nausea or Vomiting., Disp: 45 tablet, Rfl: 11  •  Soliqua 100-33 UNT-MCG/ML solution pen-injector injection, INJECT 55  SUBCUTANEOUSLY IN THE MORNING, Disp: 15 mL, Rfl: 0  •  Synjardy XR  MG tablet sustained-release 24 hour, Take 1 tablet by mouth once daily with breakfast, Disp: 30 tablet, Rfl: 0    Drug Interactions  Lisinopril can increase the risk of hyperkalemia with kerendia    Recommended Medications Assessment  • Statin - Currently Taking  • ACEi/ARB - Currently Taking    Relevant Laboratory Values  A1C Last 3 Results    HGBA1C Last 3 Results 3/3/21 9/2/21 12/6/21   Hemoglobin A1C 11.98 (A)  8.43 (A) 8.20 (A)   (A) Abnormal value            Lab Results   Component Value Date    HGBA1C 8.20 (H) 12/06/2021     Lab Results   Component Value Date    GLUCOSE 156 (H) 12/06/2021    CALCIUM 9.4 12/06/2021     12/06/2021    K 4.8 12/06/2021    CO2 23.5 12/06/2021     12/06/2021    BUN 39 (H) 12/06/2021    CREATININE 1.18 (H) 12/06/2021    EGFRIFAFRI 55 (L) 12/06/2021    BCR 33.1 (H) 12/06/2021    ANIONGAP 10.5 12/06/2021     Lab Results   Component Value Date    CHOL 140 12/06/2021    TRIG 144 12/06/2021    HDL 42 12/06/2021    LDL 73 12/06/2021     Microalbumin    Microalbumin 3/3/21 9/2/21 12/6/21   Microalbumin, Urine 3.3 3.6 6.5             Initial Education Provided for Specialty Medication  The patient has been provided with the following education and any applicable administration techniques (i.e. self-injection) have been demonstrated for the therapies indicated. All questions and concerns have been addressed prior to the patient receiving the medication, and the patient has verbalized understanding of the education and any materials provided.  Additional patient education shall be provided and documented upon request by the patient, provider or payer.      KERENDIA® (finerenone)  Medication Expectations   Why am I taking this medication? This medication helps reduce the progression of kidney disease, death from cardiovascular disease, and the risk of heart failure hospitalization in patients with chronic kidney disease and type 2 diabetes.    What should I expect while on this medication? You may see a slight increase in your potassium level when you get lab work.    How does the medication work? This medication blocks some sodium from being reabsorbed and some of the mineralocorticoid receptors in your body from being over activated. This is thought to decrease inflammation and fibrosis.    How long will I be on this medication? This is a maintenance medication meaning it will hopefully be  beneficial for you going forward. If your potassium level or kidney function change we may need to adjust or stop the medication.     How do I take this medication? Take as directed on your prescription label. This medication may be taken anytime during the day and with or without food.   What are some possible side effects? The most common side effects include high potassium, low blood pressure, and low sodium. Call your doctor if you notice swelling in your face or hands, confusion, weakness, muscle twitching, lightheadedness, fainting, or an uneven heartbeat.    What happens if I miss a dose? If you miss a dose, take it as soon as you remember. If it is close to your next dose, skip it (do not take 2 doses at once)     Medication Safety   What are things I should warn my doctor immediately about? Tell your doctor if you have ever been diagnosed with adrenal insufficiency. Let your provider know if you take potassium supplements or vitamins that may not be listed on your medication list. Also let your provider know if you take amiodarone or erythromycin. Also tell your doctor if you notice any signs/symptoms of an allergic reaction (rash, hives, difficulty breathing, etc.)    What are things that I should be cautious or aware of? Be cautious of any side effects from this medication or any new medications/supplements you start   What are some medications that can interact with this one? Do not eat grapefruit or drink grapefruit juice while taking this medication. The side effects of this medication may be increased when taken with other medications that increase potassium.       Medication Storage/Handling   How should I handle this medication? Keep this medication out of reach of pets/children in tightly sealed container.   How does this medication need to be stored? Store at room temperature and away from heat, moisture, and direct light.   How should I dispose of this medication? Take to your local police  station or health department for proper disposal. Some pharmacies have take-back bins for medication drop-off.      Resources/Support   How can I remind myself to take this medication? You can download reminder apps to help you manage your refills. You may also set an alarm on your phone to remind you. The pharmacy carries pill boxes that you can place next to an area you pass everyday (such as where you place your car keys or where you charge your phone)   Is financial support available?  [employer] can provide co-pay cards if you have commercial insurance or other patient assistance if you qualify. Ask your pharmacist for details.     Which vaccines are recommended for me? Talk to your doctor about these vaccines: Flu, Coronavirus (COVID-19), Pneumococcal (pneumonia), Tdap, Hepatitis B, Zoster (shingles)          Adherence and Self-Administration  • Barriers to Patient Adherence and/or Self-Administration: None  • Methods for Supporting Patient Adherence and/or Self-Administration: None    Reassessment Plan & Follow-Up  1. Medication Therapy Changes: Add kerendia   2. Additional Plans, Therapy Recommendations, or Therapy Problems to Be Addressed: None, we will follow her soliqua and synjardy closely to assist if possible  3. Pharmacist to perform regular reassessments no more than (6) months from the previous assessment.  4. Welcome information and patient satisfaction survey to be sent by retail team with patient's initial fill.  5. Care Coordinator to set up future refill outreaches, coordinate prescription delivery, and escalate clinical questions to pharmacist.     Attestation  I attest that the initiated specialty medication(s) are appropriate for the patient based on my assessment.  If the prescribed therapy is at any point deemed not appropriate based on the current or future assessments, a consultation will be initiated with the patient's specialty care provider to determine the best course of action. The  revised plan of therapy will be documented along with any additional patient education provided.     Rich Pandya, PharmD, MPH, BCPS    12/13/2021  10:57 CST

## 2021-12-13 NOTE — TELEPHONE ENCOUNTER
Pt called and her Synjardy XR  MG tablet sustained-release 24 hour and Soliqua 100-33 UNT-MCG/ML solution pen-injector injection have 0 refills on these prescriptions. Please call pt to advise. Thank you.

## 2022-01-06 RX ORDER — PEN NEEDLE, DIABETIC 32GX 5/32"
NEEDLE, DISPOSABLE MISCELLANEOUS
Qty: 100 EACH | Refills: 0 | Status: SHIPPED | OUTPATIENT
Start: 2022-01-06 | End: 2022-03-03

## 2022-01-11 ENCOUNTER — TELEPHONE (OUTPATIENT)
Dept: ENDOCRINOLOGY | Facility: CLINIC | Age: 67
End: 2022-01-11

## 2022-01-18 ENCOUNTER — LAB (OUTPATIENT)
Dept: LAB | Facility: HOSPITAL | Age: 67
End: 2022-01-18

## 2022-01-18 DIAGNOSIS — E11.65 TYPE 2 DIABETES MELLITUS WITH HYPERGLYCEMIA, WITH LONG-TERM CURRENT USE OF INSULIN: ICD-10-CM

## 2022-01-18 DIAGNOSIS — Z79.4 TYPE 2 DIABETES MELLITUS WITH HYPERGLYCEMIA, WITH LONG-TERM CURRENT USE OF INSULIN: ICD-10-CM

## 2022-01-18 LAB
ANION GAP SERPL CALCULATED.3IONS-SCNC: 8.1 MMOL/L (ref 5–15)
BUN SERPL-MCNC: 26 MG/DL (ref 8–23)
BUN/CREAT SERPL: 21.1 (ref 7–25)
CALCIUM SPEC-SCNC: 9.8 MG/DL (ref 8.6–10.5)
CHLORIDE SERPL-SCNC: 108 MMOL/L (ref 98–107)
CO2 SERPL-SCNC: 26.9 MMOL/L (ref 22–29)
CREAT SERPL-MCNC: 1.23 MG/DL (ref 0.57–1)
GFR SERPL CREATININE-BSD FRML MDRD: 53 ML/MIN/1.73
GLUCOSE SERPL-MCNC: 135 MG/DL (ref 65–99)
POTASSIUM SERPL-SCNC: 4.5 MMOL/L (ref 3.5–5.2)
SODIUM SERPL-SCNC: 143 MMOL/L (ref 136–145)

## 2022-01-18 PROCEDURE — 80048 BASIC METABOLIC PNL TOTAL CA: CPT

## 2022-01-19 ENCOUNTER — SPECIALTY PHARMACY (OUTPATIENT)
Dept: ENDOCRINOLOGY | Facility: CLINIC | Age: 67
End: 2022-01-19

## 2022-01-19 ENCOUNTER — TELEPHONE (OUTPATIENT)
Dept: ENDOCRINOLOGY | Facility: CLINIC | Age: 67
End: 2022-01-19

## 2022-01-19 DIAGNOSIS — Z79.4 TYPE 2 DIABETES MELLITUS WITH HYPERGLYCEMIA, WITH LONG-TERM CURRENT USE OF INSULIN: Primary | ICD-10-CM

## 2022-01-19 DIAGNOSIS — E55.9 VITAMIN D DEFICIENCY: ICD-10-CM

## 2022-01-19 DIAGNOSIS — E11.65 TYPE 2 DIABETES MELLITUS WITH HYPERGLYCEMIA, WITH LONG-TERM CURRENT USE OF INSULIN: Primary | ICD-10-CM

## 2022-01-19 DIAGNOSIS — N18.31 CHRONIC KIDNEY DISEASE, STAGE 3A: ICD-10-CM

## 2022-01-19 DIAGNOSIS — E11.69 MIXED DIABETIC HYPERLIPIDEMIA ASSOCIATED WITH TYPE 2 DIABETES MELLITUS: ICD-10-CM

## 2022-01-19 DIAGNOSIS — E11.59 HYPERTENSION ASSOCIATED WITH DIABETES: ICD-10-CM

## 2022-01-19 DIAGNOSIS — E78.2 MIXED DIABETIC HYPERLIPIDEMIA ASSOCIATED WITH TYPE 2 DIABETES MELLITUS: ICD-10-CM

## 2022-01-19 DIAGNOSIS — I15.2 HYPERTENSION ASSOCIATED WITH DIABETES: ICD-10-CM

## 2022-01-19 RX ORDER — FINERENONE 20 MG/1
20 TABLET, FILM COATED ORAL DAILY
Qty: 30 TABLET | Refills: 11 | Status: SHIPPED | OUTPATIENT
Start: 2022-01-19 | End: 2022-11-01 | Stop reason: SDUPTHER

## 2022-01-19 NOTE — PROGRESS NOTES
Spoke with Ms. Raleigh who understands now that her potassium is within expected range and we will be increasing her dose of kerendia to 20 mg daily. She will take two of the 10 mg tablets she has remaining until they are gone and will plan to get new labs before her next visit with Dr. Ding in March.     Also, changing to dexcom per insurance requirement

## 2022-01-19 NOTE — TELEPHONE ENCOUNTER
Pt would like a call about the status of her PA for the soliqua. She is almost out of medication. Thank you

## 2022-01-24 ENCOUNTER — TELEPHONE (OUTPATIENT)
Dept: ENDOCRINOLOGY | Facility: CLINIC | Age: 67
End: 2022-01-24

## 2022-01-24 NOTE — TELEPHONE ENCOUNTER
Pt is needing PA for Soliqua sent back to pharmacy. I called pharmacy with our fax number so they should be sending it over soon.     [facility] in [facility].

## 2022-01-26 ENCOUNTER — SPECIALTY PHARMACY (OUTPATIENT)
Dept: ENDOCRINOLOGY | Facility: CLINIC | Age: 67
End: 2022-01-26

## 2022-01-26 RX ORDER — PROCHLORPERAZINE 25 MG/1
1 SUPPOSITORY RECTAL
Qty: 1 EACH | Refills: 4 | Status: SHIPPED | OUTPATIENT
Start: 2022-01-26 | End: 2022-12-28 | Stop reason: SDUPTHER

## 2022-01-26 RX ORDER — PROCHLORPERAZINE 25 MG/1
1 SUPPOSITORY RECTAL ONCE
Qty: 1 EACH | Refills: 0 | Status: SHIPPED | OUTPATIENT
Start: 2022-01-26 | End: 2022-01-28

## 2022-01-26 RX ORDER — PROCHLORPERAZINE 25 MG/1
SUPPOSITORY RECTAL
Qty: 3 EACH | Refills: 11 | Status: SHIPPED | OUTPATIENT
Start: 2022-01-26 | End: 2022-12-28 | Stop reason: SDUPTHER

## 2022-02-03 ENCOUNTER — SPECIALTY PHARMACY (OUTPATIENT)
Dept: ENDOCRINOLOGY | Facility: CLINIC | Age: 67
End: 2022-02-03

## 2022-02-03 NOTE — PROGRESS NOTES
Spoke with Ms. Olden regarding her dexcom. She has figured out how to use it and has no questions at this time. I reminded her to not throw away the transmitter. She is very happy to have it and believes it is already teaching her what she can and can't eat or how many carbs are in a meal. No other questions for me at this time and she will call to let us know if she runs into any issues.

## 2022-02-10 RX ORDER — PROMETHAZINE HYDROCHLORIDE 12.5 MG/1
TABLET ORAL
Qty: 45 TABLET | Refills: 0 | Status: SHIPPED | OUTPATIENT
Start: 2022-02-10 | End: 2022-04-01 | Stop reason: SDUPTHER

## 2022-02-10 RX ORDER — INSULIN ASPART INJECTION 100 [IU]/ML
INJECTION, SOLUTION SUBCUTANEOUS
Qty: 15 ML | Refills: 0 | Status: SHIPPED | OUTPATIENT
Start: 2022-02-10 | End: 2022-04-13

## 2022-02-16 ENCOUNTER — SPECIALTY PHARMACY (OUTPATIENT)
Dept: ENDOCRINOLOGY | Facility: CLINIC | Age: 67
End: 2022-02-16

## 2022-02-16 NOTE — PROGRESS NOTES
Specialty Pharmacy Refill Coordination Note     Ellen is a 67 y.o. female contacted today regarding refills of  Kerendia specialty medication(s).    Reviewed and verified with patient:  Allergies  Meds       Specialty medication(s) and dose(s) confirmed: yes    Refill Questions      Most Recent Value   Changes to allergies? No   Changes to medications? No   New conditions since last clinic visit No   Unplanned office visit, urgent care, ED, or hospital admission in the last 4 weeks  No   How does patient/caregiver feel medication is working? Very good   Financial problems or insurance changes  No   How many doses of your specialty medications were missed in the last 4 weeks? 0   Does this patient require a clinical escalation to a pharmacist? No          Delivery Questions      Most Recent Value   Delivery method FedEx   Delivery address correct? Yes   Delivery phone number [phone]   Number of medications in delivery 1   Medication being filled and delivered kerendia   Doses left of specialty medications 4   Is there any medication that is due not being filled? No   Supplies needed? No supplies needed   Cooler needed? No   Do any medications need mixed or dated? No   Additional comments voucher on file   Questions or concerns for the pharmacist? No   Are any medications first time fills? No          Pt is due on Sunday so medications can be mailed out now to arrive on time.         Follow-up: 4 weeks.      Sriram Longoria  Specialty Pharmacy Technician

## 2022-03-03 RX ORDER — PEN NEEDLE, DIABETIC 32GX 5/32"
NEEDLE, DISPOSABLE MISCELLANEOUS
Qty: 100 EACH | Refills: 0 | Status: SHIPPED | OUTPATIENT
Start: 2022-03-03 | End: 2022-04-01 | Stop reason: SDUPTHER

## 2022-03-14 ENCOUNTER — LAB (OUTPATIENT)
Dept: LAB | Facility: HOSPITAL | Age: 67
End: 2022-03-14

## 2022-03-14 LAB
25(OH)D3 SERPL-MCNC: 31.7 NG/ML (ref 30–100)
ALBUMIN SERPL-MCNC: 3.9 G/DL (ref 3.5–5.2)
ALBUMIN/GLOB SERPL: 1.4 G/DL
ALP SERPL-CCNC: 129 U/L (ref 39–117)
ALT SERPL W P-5'-P-CCNC: 15 U/L (ref 1–33)
ANION GAP SERPL CALCULATED.3IONS-SCNC: 10 MMOL/L (ref 5–15)
AST SERPL-CCNC: 17 U/L (ref 1–32)
BASOPHILS # BLD AUTO: 0.09 10*3/MM3 (ref 0–0.2)
BASOPHILS NFR BLD AUTO: 1.3 % (ref 0–1.5)
BILIRUB SERPL-MCNC: 0.4 MG/DL (ref 0–1.2)
BUN SERPL-MCNC: 23 MG/DL (ref 8–23)
BUN/CREAT SERPL: 20.4 (ref 7–25)
CALCIUM SPEC-SCNC: 9.2 MG/DL (ref 8.6–10.5)
CHLORIDE SERPL-SCNC: 107 MMOL/L (ref 98–107)
CHOLEST SERPL-MCNC: 162 MG/DL (ref 0–200)
CO2 SERPL-SCNC: 24 MMOL/L (ref 22–29)
CREAT SERPL-MCNC: 1.13 MG/DL (ref 0.57–1)
DEPRECATED RDW RBC AUTO: 50.2 FL (ref 37–54)
EGFRCR SERPLBLD CKD-EPI 2021: 53.1 ML/MIN/1.73
EOSINOPHIL # BLD AUTO: 0.34 10*3/MM3 (ref 0–0.4)
EOSINOPHIL NFR BLD AUTO: 4.8 % (ref 0.3–6.2)
ERYTHROCYTE [DISTWIDTH] IN BLOOD BY AUTOMATED COUNT: 15.1 % (ref 12.3–15.4)
GLOBULIN UR ELPH-MCNC: 2.7 GM/DL
GLUCOSE SERPL-MCNC: 123 MG/DL (ref 65–99)
HBA1C MFR BLD: 7.5 % (ref 4.8–5.6)
HCT VFR BLD AUTO: 42.5 % (ref 34–46.6)
HDLC SERPL-MCNC: 42 MG/DL (ref 40–60)
HGB BLD-MCNC: 13.6 G/DL (ref 12–15.9)
IMM GRANULOCYTES # BLD AUTO: 0.06 10*3/MM3 (ref 0–0.05)
IMM GRANULOCYTES NFR BLD AUTO: 0.8 % (ref 0–0.5)
LDLC SERPL CALC-MCNC: 85 MG/DL (ref 0–100)
LDLC/HDLC SERPL: 1.87 {RATIO}
LYMPHOCYTES # BLD AUTO: 2.73 10*3/MM3 (ref 0.7–3.1)
LYMPHOCYTES NFR BLD AUTO: 38.3 % (ref 19.6–45.3)
MCH RBC QN AUTO: 29.1 PG (ref 26.6–33)
MCHC RBC AUTO-ENTMCNC: 32 G/DL (ref 31.5–35.7)
MCV RBC AUTO: 91 FL (ref 79–97)
MONOCYTES # BLD AUTO: 0.66 10*3/MM3 (ref 0.1–0.9)
MONOCYTES NFR BLD AUTO: 9.3 % (ref 5–12)
NEUTROPHILS NFR BLD AUTO: 3.24 10*3/MM3 (ref 1.7–7)
NEUTROPHILS NFR BLD AUTO: 45.5 % (ref 42.7–76)
NRBC BLD AUTO-RTO: 0 /100 WBC (ref 0–0.2)
PLATELET # BLD AUTO: 231 10*3/MM3 (ref 140–450)
PMV BLD AUTO: 12 FL (ref 6–12)
POTASSIUM SERPL-SCNC: 4.2 MMOL/L (ref 3.5–5.2)
PROT SERPL-MCNC: 6.6 G/DL (ref 6–8.5)
RBC # BLD AUTO: 4.67 10*6/MM3 (ref 3.77–5.28)
SODIUM SERPL-SCNC: 141 MMOL/L (ref 136–145)
TRIGL SERPL-MCNC: 208 MG/DL (ref 0–150)
TSH SERPL DL<=0.05 MIU/L-ACNC: 2.09 UIU/ML (ref 0.27–4.2)
VIT B12 BLD-MCNC: 487 PG/ML (ref 211–946)
VLDLC SERPL-MCNC: 35 MG/DL (ref 5–40)
WBC NRBC COR # BLD: 7.12 10*3/MM3 (ref 3.4–10.8)

## 2022-03-14 PROCEDURE — 82306 VITAMIN D 25 HYDROXY: CPT | Performed by: INTERNAL MEDICINE

## 2022-03-14 PROCEDURE — 82570 ASSAY OF URINE CREATININE: CPT | Performed by: INTERNAL MEDICINE

## 2022-03-14 PROCEDURE — 83036 HEMOGLOBIN GLYCOSYLATED A1C: CPT | Performed by: INTERNAL MEDICINE

## 2022-03-14 PROCEDURE — 80050 GENERAL HEALTH PANEL: CPT | Performed by: INTERNAL MEDICINE

## 2022-03-14 PROCEDURE — 80061 LIPID PANEL: CPT | Performed by: INTERNAL MEDICINE

## 2022-03-14 PROCEDURE — 82607 VITAMIN B-12: CPT | Performed by: INTERNAL MEDICINE

## 2022-03-14 PROCEDURE — 82043 UR ALBUMIN QUANTITATIVE: CPT | Performed by: INTERNAL MEDICINE

## 2022-03-15 ENCOUNTER — SPECIALTY PHARMACY (OUTPATIENT)
Dept: ENDOCRINOLOGY | Facility: CLINIC | Age: 67
End: 2022-03-15

## 2022-03-15 LAB
ALBUMIN UR-MCNC: 5.4 MG/DL
CREAT UR-MCNC: 51.7 MG/DL
MICROALBUMIN/CREAT UR: 104.4 MG/G

## 2022-03-16 ENCOUNTER — SPECIALTY PHARMACY (OUTPATIENT)
Dept: ENDOCRINOLOGY | Facility: CLINIC | Age: 67
End: 2022-03-16

## 2022-03-16 ENCOUNTER — OFFICE VISIT (OUTPATIENT)
Dept: ENDOCRINOLOGY | Facility: CLINIC | Age: 67
End: 2022-03-16

## 2022-03-16 VITALS
DIASTOLIC BLOOD PRESSURE: 60 MMHG | HEART RATE: 57 BPM | WEIGHT: 227 LBS | BODY MASS INDEX: 35.63 KG/M2 | OXYGEN SATURATION: 97 % | HEIGHT: 67 IN | SYSTOLIC BLOOD PRESSURE: 110 MMHG

## 2022-03-16 DIAGNOSIS — Z79.4 TYPE 2 DIABETES MELLITUS WITH HYPERGLYCEMIA, WITH LONG-TERM CURRENT USE OF INSULIN: Primary | ICD-10-CM

## 2022-03-16 DIAGNOSIS — I15.2 HYPERTENSION ASSOCIATED WITH DIABETES: ICD-10-CM

## 2022-03-16 DIAGNOSIS — E11.69 MIXED DIABETIC HYPERLIPIDEMIA ASSOCIATED WITH TYPE 2 DIABETES MELLITUS: ICD-10-CM

## 2022-03-16 DIAGNOSIS — E11.59 HYPERTENSION ASSOCIATED WITH DIABETES: ICD-10-CM

## 2022-03-16 DIAGNOSIS — E55.9 VITAMIN D DEFICIENCY: ICD-10-CM

## 2022-03-16 DIAGNOSIS — N18.31 CHRONIC KIDNEY DISEASE, STAGE 3A: ICD-10-CM

## 2022-03-16 DIAGNOSIS — E11.65 TYPE 2 DIABETES MELLITUS WITH HYPERGLYCEMIA, WITH LONG-TERM CURRENT USE OF INSULIN: Primary | ICD-10-CM

## 2022-03-16 DIAGNOSIS — E78.2 MIXED DIABETIC HYPERLIPIDEMIA ASSOCIATED WITH TYPE 2 DIABETES MELLITUS: ICD-10-CM

## 2022-03-16 PROCEDURE — 99214 OFFICE O/P EST MOD 30 MIN: CPT | Performed by: INTERNAL MEDICINE

## 2022-03-16 PROCEDURE — 95251 CONT GLUC MNTR ANALYSIS I&R: CPT | Performed by: INTERNAL MEDICINE

## 2022-03-16 NOTE — PROGRESS NOTES
" Ellen Storey is a 67 y.o. female who presents for  evaluation of diabetes       HPI    Duration 10 years     Quality -  Now controlled     Severity -  severe    Complications - retinopathy , ckd stage III    Current symptoms/problems  none     Alleviating Factors: Compliance       Side Effects  none    Current diet  in general, a \"healthy\" diet      Current exercise walking    Current monitoring regimen: home blood tests - rarely checking    Hypoglycemia none      PE    /60   Pulse 57   Ht 170.2 cm (67\")   Wt 103 kg (227 lb)   SpO2 97%   BMI 35.55 kg/m²   AOx3  No visible goiter  RRR  CTA  No Edema    Labs    Lab Results   Component Value Date    WBC 7.12 03/14/2022    HGB 13.6 03/14/2022    HCT 42.5 03/14/2022    MCV 91.0 03/14/2022     03/14/2022     Lab Results   Component Value Date    GLUCOSE 123 (H) 03/14/2022    BUN 23 03/14/2022    CREATININE 1.13 (H) 03/14/2022    EGFRIFAFRI 53 (L) 01/18/2022    BCR 20.4 03/14/2022    K 4.2 03/14/2022    CO2 24.0 03/14/2022    CALCIUM 9.2 03/14/2022    ALBUMIN 3.90 03/14/2022    AST 17 03/14/2022    ALT 15 03/14/2022         Assessment/Plan       ICD-10-CM ICD-9-CM   1. Type 2 diabetes mellitus with hyperglycemia, with long-term current use of insulin ([facility])  E11.65 250.00    Z79.4 790.29     V58.67   2. Hypertension associated with diabetes ([facility])  E11.59 250.80    I15.2 401.9   3. Mixed diabetic hyperlipidemia associated with type 2 diabetes mellitus ([facility])  E11.69 250.80    E78.2 272.2   4. Chronic kidney disease, stage 3a ([facility])  N18.31 585.3   5. Vitamin D deficiency  E55.9 268.9         I reviewed and summarized records from Isauro Prado MD from current year  and I reviewed / ordered labs.   From review of records :    Pt has type 2 diabetes , at goal         Glycemic Management:   Lab Results   Component Value Date    HGBA1C 7.50 (H) 03/14/2022    HGBA1C 8.20 (H) 12/06/2021    HGBA1C 8.43 (H) 09/02/2021     Lab Results   Component Value Date    " "GLUCOSE 123 (H) 03/14/2022    BUN 23 03/14/2022    CREATININE 1.13 (H) 03/14/2022    EGFRIFAFRI 53 (L) 01/18/2022    BCR 20.4 03/14/2022    K 4.2 03/14/2022    CO2 24.0 03/14/2022    CALCIUM 9.2 03/14/2022    ALBUMIN 3.90 03/14/2022    AST 17 03/14/2022    ALT 15 03/14/2022    ANIONGAP 10.0 03/14/2022     Lab Results   Component Value Date    WBC 7.12 03/14/2022    HGB 13.6 03/14/2022    HCT 42.5 03/14/2022    MCV 91.0 03/14/2022     03/14/2022             soliqua 55am   =================     synjardy one tablet with the first meal of the day   10/1000    ==========    Fiasp     12 to 22 that she has learned to estimate      dexcom , 2 week review, excellent type 2 diabetes control         --    Dyslipidemia on lipitor     Lab Results   Component Value Date    CHOL 162 03/14/2022    TRIG 208 (H) 03/14/2022    HDL 42 03/14/2022    LDL 85 03/14/2022       --    HTN controlled on lisinopril regimen   /60   Pulse 57   Ht 170.2 cm (67\")   Wt 103 kg (227 lb)   SpO2 97%   BMI 35.55 kg/m²     --  Microvascular complications  ckd stage 3aA2    On lisinopril 40 , jardiance and kerendia      ,     Retinopathy , stable, [place]    Mild neuropathy , not painful       No orders of the defined types were placed in this encounter.        A copy of my note was sent to Isauro Prado MD    Please see my above opinion and suggestions.           This document has been electronically signed by Efren Crow MD on March 16, 2022 08:07 CDT    "

## 2022-03-16 NOTE — PROGRESS NOTES
Spoke with Ms. Storey in the office today. She is doing very with kerendia, labs improved, and reports no issues. She is appreciative of our service always being on time and reports never missing doses. We will mail na out to her today and continue following as she needs us.

## 2022-04-01 DIAGNOSIS — Z79.4 TYPE 2 DIABETES MELLITUS WITH HYPERGLYCEMIA, WITH LONG-TERM CURRENT USE OF INSULIN: Primary | ICD-10-CM

## 2022-04-01 DIAGNOSIS — E11.65 TYPE 2 DIABETES MELLITUS WITH HYPERGLYCEMIA, WITH LONG-TERM CURRENT USE OF INSULIN: Primary | ICD-10-CM

## 2022-04-01 RX ORDER — PEN NEEDLE, DIABETIC 32GX 5/32"
1 NEEDLE, DISPOSABLE MISCELLANEOUS 4 TIMES DAILY
Qty: 100 EACH | Refills: 5 | Status: SHIPPED | OUTPATIENT
Start: 2022-04-01 | End: 2022-09-22

## 2022-04-01 RX ORDER — PROMETHAZINE HYDROCHLORIDE 12.5 MG/1
12.5 TABLET ORAL EVERY 6 HOURS PRN
Qty: 45 TABLET | Refills: 3 | Status: SHIPPED | OUTPATIENT
Start: 2022-04-01 | End: 2023-03-27 | Stop reason: SDUPTHER

## 2022-04-11 ENCOUNTER — SPECIALTY PHARMACY (OUTPATIENT)
Dept: ENDOCRINOLOGY | Facility: CLINIC | Age: 67
End: 2022-04-11

## 2022-04-11 NOTE — PROGRESS NOTES
Patient is ready for a refill of kerendia and dexcom  She reports doing well with both  She did have an issue with one dexcom sensor not working correctly  She expresses no issues or questions at this time  I continue offering to help with her other medications from Dr. Ding  We will mail her medicine tomorrow  New commercial card loaded for kerendia  Expected copay for each is $0

## 2022-04-13 RX ORDER — INSULIN ASPART INJECTION 100 [IU]/ML
INJECTION, SOLUTION SUBCUTANEOUS
Qty: 15 ML | Refills: 0 | Status: SHIPPED | OUTPATIENT
Start: 2022-04-13 | End: 2022-05-09

## 2022-05-09 ENCOUNTER — SPECIALTY PHARMACY (OUTPATIENT)
Dept: ENDOCRINOLOGY | Facility: CLINIC | Age: 67
End: 2022-05-09

## 2022-05-09 RX ORDER — INSULIN ASPART INJECTION 100 [IU]/ML
INJECTION, SOLUTION SUBCUTANEOUS
Qty: 15 ML | Refills: 0 | Status: SHIPPED | OUTPATIENT
Start: 2022-05-09 | End: 2022-08-05

## 2022-05-09 NOTE — PROGRESS NOTES
No labs due at this time  Commercial copay card loaded  No issues to report  Has a few days of medication left  Zero missed doses  All questions answered  Wants to keep michael and synjardy at [facility] still - I offered to help whenever she needs us.

## 2022-06-08 ENCOUNTER — SPECIALTY PHARMACY (OUTPATIENT)
Dept: ENDOCRINOLOGY | Facility: CLINIC | Age: 67
End: 2022-06-08

## 2022-06-08 NOTE — PROGRESS NOTES
Specialty Pharmacy Refill Coordination Note     Ellen is a 67 y.o. female contacted today regarding refills of  Kerendia specialty medication(s).    Reviewed and verified with patient:  Allergies         Specialty medication(s) and dose(s) confirmed: yes    Refill Questions    Flowsheet Row Most Recent Value   Changes to allergies? No   New conditions since last clinic visit No   Unplanned office visit, urgent care, ED, or hospital admission in the last 4 weeks  No   How does patient/caregiver feel medication is working? Very good   Financial problems or insurance changes  No   Since the previous refill, were any specialty medication doses or scheduled injections missed or delayed?  No   If yes, please provide the amount 0   Does this patient require a clinical escalation to a pharmacist? No          Delivery Questions    Flowsheet Row Most Recent Value   Delivery method FedEx   Delivery address correct? Yes   Delivery phone number [phone]   Number of medications in delivery 1   Medication being filled and delivered kerendia   Doses left of specialty medications 7   Is there any medication that is due not being filled? No   Supplies needed? No supplies needed   Cooler needed? No   Do any medications need mixed or dated? No   Copay form of payment Credit card on file   Questions or concerns for the pharmacist? No   Are any medications first time fills? No        Patient is doing good on the medication, and does need a refill.  We will ship it out tomorrow. No issues to report.            Follow-up: 1 month.      Sriram Longoria  Specialty Pharmacy Technician

## 2022-07-05 ENCOUNTER — SPECIALTY PHARMACY (OUTPATIENT)
Dept: ENDOCRINOLOGY | Facility: CLINIC | Age: 67
End: 2022-07-05

## 2022-07-05 NOTE — PROGRESS NOTES
Specialty Pharmacy Refill Coordination Note     Ellen is a 67 y.o. female contacted today regarding refills of  Kerendia and dexcom sensor and transmitter specialty medication(s).    Reviewed and verified with patient:  Allergies         Specialty medication(s) and dose(s) confirmed: yes    Refill Questions    Flowsheet Row Most Recent Value   Changes to allergies? No   Changes to medications? No   New conditions since last clinic visit No   Unplanned office visit, urgent care, ED, or hospital admission in the last 4 weeks  No   How does patient/caregiver feel medication is working? Very good   Financial problems or insurance changes  No   Since the previous refill, were any specialty medication doses or scheduled injections missed or delayed?  No   Does this patient require a clinical escalation to a pharmacist? No          Delivery Questions    Flowsheet Row Most Recent Value   Delivery method FedEx   Delivery address correct? Yes   Delivery phone number [phone]   Number of medications in delivery 3   Medication being filled and delivered kerendia, dexcom sensor and transmitter   Doses left of specialty medications 10   Is there any medication that is due not being filled? No   Supplies needed? No supplies needed   Copay form of payment Credit card on file   Questions or concerns for the pharmacist? No   Are any medications first time fills? No        Will mail out next Tuesday, she has 1 more sensor to put on for Friday.          Follow-up: 1 month kerendia and 3 months dexcom     Sriram L. Von  Specialty Pharmacy Technician

## 2022-08-02 DIAGNOSIS — Z79.4 TYPE 2 DIABETES MELLITUS WITH HYPERGLYCEMIA, WITH LONG-TERM CURRENT USE OF INSULIN: ICD-10-CM

## 2022-08-02 DIAGNOSIS — E11.65 TYPE 2 DIABETES MELLITUS WITH HYPERGLYCEMIA, WITH LONG-TERM CURRENT USE OF INSULIN: ICD-10-CM

## 2022-08-03 RX ORDER — EMPAGLIFLOZIN, METFORMIN HYDROCHLORIDE 10; 1000 MG/1; MG/1
TABLET, EXTENDED RELEASE ORAL
Qty: 30 TABLET | Refills: 2 | Status: SHIPPED | OUTPATIENT
Start: 2022-08-03 | End: 2022-09-16 | Stop reason: SDUPTHER

## 2022-08-05 ENCOUNTER — SPECIALTY PHARMACY (OUTPATIENT)
Dept: ENDOCRINOLOGY | Facility: CLINIC | Age: 67
End: 2022-08-05

## 2022-08-05 RX ORDER — INSULIN ASPART INJECTION 100 [IU]/ML
INJECTION, SOLUTION SUBCUTANEOUS
Qty: 15 ML | Refills: 0 | Status: SHIPPED | OUTPATIENT
Start: 2022-08-05 | End: 2022-09-16 | Stop reason: SDUPTHER

## 2022-08-05 NOTE — PROGRESS NOTES
Specialty Pharmacy Refill Coordination Note     Ellen is a 67 y.o. female contacted today regarding refills of  kerendia specialty medication(s).    Reviewed and verified with patient:       Specialty medication(s) and dose(s) confirmed: yes    Refill Questions    Flowsheet Row Most Recent Value   Changes to allergies? No   Changes to medications? No   New conditions since last clinic visit No   Unplanned office visit, urgent care, ED, or hospital admission in the last 4 weeks  No   How does patient/caregiver feel medication is working? Very good   Financial problems or insurance changes  No   Since the previous refill, were any specialty medication doses or scheduled injections missed or delayed?  No   Does this patient require a clinical escalation to a pharmacist? No          Delivery Questions    Flowsheet Row Most Recent Value   Delivery method FedEx   Delivery address correct? Yes   Delivery phone number [phone]   Number of medications in delivery 1   Medication being filled and delivered kerendia   Doses left of specialty medications 9   Is there any medication that is due not being filled? No   Supplies needed? No supplies needed   Do any medications need mixed or dated? No   Additional comments 0.00   Questions or concerns for the pharmacist? No   Are any medications first time fills? No        Card now covers 90 day supply so we are going to mail it out on Monday.  No issues to report.          Follow-up: 3 months.      Sriram Longoria  Specialty Pharmacy Technician

## 2022-09-09 ENCOUNTER — LAB (OUTPATIENT)
Dept: LAB | Facility: HOSPITAL | Age: 67
End: 2022-09-09

## 2022-09-09 DIAGNOSIS — E11.65 TYPE 2 DIABETES MELLITUS WITH HYPERGLYCEMIA, WITH LONG-TERM CURRENT USE OF INSULIN: ICD-10-CM

## 2022-09-09 DIAGNOSIS — E55.9 VITAMIN D DEFICIENCY: ICD-10-CM

## 2022-09-09 DIAGNOSIS — Z79.4 TYPE 2 DIABETES MELLITUS WITH HYPERGLYCEMIA, WITH LONG-TERM CURRENT USE OF INSULIN: ICD-10-CM

## 2022-09-09 DIAGNOSIS — E11.69 MIXED DIABETIC HYPERLIPIDEMIA ASSOCIATED WITH TYPE 2 DIABETES MELLITUS: ICD-10-CM

## 2022-09-09 DIAGNOSIS — E78.2 MIXED DIABETIC HYPERLIPIDEMIA ASSOCIATED WITH TYPE 2 DIABETES MELLITUS: ICD-10-CM

## 2022-09-09 DIAGNOSIS — E11.65 TYPE 2 DIABETES MELLITUS WITH HYPERGLYCEMIA, WITH LONG-TERM CURRENT USE OF INSULIN: Primary | ICD-10-CM

## 2022-09-09 DIAGNOSIS — Z79.4 TYPE 2 DIABETES MELLITUS WITH HYPERGLYCEMIA, WITH LONG-TERM CURRENT USE OF INSULIN: Primary | ICD-10-CM

## 2022-09-09 PROCEDURE — 80061 LIPID PANEL: CPT

## 2022-09-09 PROCEDURE — 83036 HEMOGLOBIN GLYCOSYLATED A1C: CPT

## 2022-09-09 PROCEDURE — 80050 GENERAL HEALTH PANEL: CPT

## 2022-09-09 PROCEDURE — 82570 ASSAY OF URINE CREATININE: CPT

## 2022-09-09 PROCEDURE — 82306 VITAMIN D 25 HYDROXY: CPT

## 2022-09-09 PROCEDURE — 82043 UR ALBUMIN QUANTITATIVE: CPT

## 2022-09-09 PROCEDURE — 82607 VITAMIN B-12: CPT

## 2022-09-10 LAB
25(OH)D3 SERPL-MCNC: 42.5 NG/ML (ref 30–100)
ALBUMIN SERPL-MCNC: 4 G/DL (ref 3.5–5.2)
ALBUMIN UR-MCNC: 9.1 MG/DL
ALBUMIN/GLOB SERPL: 1.4 G/DL
ALP SERPL-CCNC: 108 U/L (ref 39–117)
ALT SERPL W P-5'-P-CCNC: 15 U/L (ref 1–33)
ANION GAP SERPL CALCULATED.3IONS-SCNC: 11.2 MMOL/L (ref 5–15)
AST SERPL-CCNC: 17 U/L (ref 1–32)
BASOPHILS # BLD AUTO: 0.07 10*3/MM3 (ref 0–0.2)
BASOPHILS NFR BLD AUTO: 1.2 % (ref 0–1.5)
BILIRUB SERPL-MCNC: 0.3 MG/DL (ref 0–1.2)
BUN SERPL-MCNC: 22 MG/DL (ref 8–23)
BUN/CREAT SERPL: 18.6 (ref 7–25)
CALCIUM SPEC-SCNC: 9.6 MG/DL (ref 8.6–10.5)
CHLORIDE SERPL-SCNC: 105 MMOL/L (ref 98–107)
CHOLEST SERPL-MCNC: 180 MG/DL (ref 0–200)
CO2 SERPL-SCNC: 22.8 MMOL/L (ref 22–29)
CREAT SERPL-MCNC: 1.18 MG/DL (ref 0.57–1)
CREAT UR-MCNC: 107.3 MG/DL
DEPRECATED RDW RBC AUTO: 46.6 FL (ref 37–54)
EGFRCR SERPLBLD CKD-EPI 2021: 50.4 ML/MIN/1.73
EOSINOPHIL # BLD AUTO: 0.23 10*3/MM3 (ref 0–0.4)
EOSINOPHIL NFR BLD AUTO: 4 % (ref 0.3–6.2)
ERYTHROCYTE [DISTWIDTH] IN BLOOD BY AUTOMATED COUNT: 14.1 % (ref 12.3–15.4)
GLOBULIN UR ELPH-MCNC: 2.9 GM/DL
GLUCOSE SERPL-MCNC: 89 MG/DL (ref 65–99)
HBA1C MFR BLD: 6.9 % (ref 4.8–5.6)
HCT VFR BLD AUTO: 43.4 % (ref 34–46.6)
HDLC SERPL-MCNC: 57 MG/DL (ref 40–60)
HGB BLD-MCNC: 14.3 G/DL (ref 12–15.9)
IMM GRANULOCYTES # BLD AUTO: 0.04 10*3/MM3 (ref 0–0.05)
IMM GRANULOCYTES NFR BLD AUTO: 0.7 % (ref 0–0.5)
LDLC SERPL CALC-MCNC: 107 MG/DL (ref 0–100)
LDLC/HDLC SERPL: 1.84 {RATIO}
LYMPHOCYTES # BLD AUTO: 1.92 10*3/MM3 (ref 0.7–3.1)
LYMPHOCYTES NFR BLD AUTO: 33.4 % (ref 19.6–45.3)
MCH RBC QN AUTO: 30 PG (ref 26.6–33)
MCHC RBC AUTO-ENTMCNC: 32.9 G/DL (ref 31.5–35.7)
MCV RBC AUTO: 91 FL (ref 79–97)
MICROALBUMIN/CREAT UR: 84.8 MG/G
MONOCYTES # BLD AUTO: 0.51 10*3/MM3 (ref 0.1–0.9)
MONOCYTES NFR BLD AUTO: 8.9 % (ref 5–12)
NEUTROPHILS NFR BLD AUTO: 2.98 10*3/MM3 (ref 1.7–7)
NEUTROPHILS NFR BLD AUTO: 51.8 % (ref 42.7–76)
NRBC BLD AUTO-RTO: 0 /100 WBC (ref 0–0.2)
PLATELET # BLD AUTO: 241 10*3/MM3 (ref 140–450)
PMV BLD AUTO: 12.4 FL (ref 6–12)
POTASSIUM SERPL-SCNC: 4.3 MMOL/L (ref 3.5–5.2)
PROT SERPL-MCNC: 6.9 G/DL (ref 6–8.5)
RBC # BLD AUTO: 4.77 10*6/MM3 (ref 3.77–5.28)
SODIUM SERPL-SCNC: 139 MMOL/L (ref 136–145)
TRIGL SERPL-MCNC: 90 MG/DL (ref 0–150)
TSH SERPL DL<=0.05 MIU/L-ACNC: 1.38 UIU/ML (ref 0.27–4.2)
VIT B12 BLD-MCNC: 877 PG/ML (ref 211–946)
VLDLC SERPL-MCNC: 16 MG/DL (ref 5–40)
WBC NRBC COR # BLD: 5.75 10*3/MM3 (ref 3.4–10.8)

## 2022-09-15 ENCOUNTER — DOCUMENTATION (OUTPATIENT)
Dept: ENDOCRINOLOGY | Facility: CLINIC | Age: 67
End: 2022-09-15

## 2022-09-16 ENCOUNTER — OFFICE VISIT (OUTPATIENT)
Dept: ENDOCRINOLOGY | Facility: CLINIC | Age: 67
End: 2022-09-16

## 2022-09-16 VITALS
DIASTOLIC BLOOD PRESSURE: 60 MMHG | HEIGHT: 67 IN | BODY MASS INDEX: 35.16 KG/M2 | SYSTOLIC BLOOD PRESSURE: 120 MMHG | OXYGEN SATURATION: 98 % | WEIGHT: 224 LBS | HEART RATE: 70 BPM

## 2022-09-16 DIAGNOSIS — I15.2 HYPERTENSION ASSOCIATED WITH DIABETES: ICD-10-CM

## 2022-09-16 DIAGNOSIS — E11.59 HYPERTENSION ASSOCIATED WITH DIABETES: ICD-10-CM

## 2022-09-16 DIAGNOSIS — N18.31 CHRONIC KIDNEY DISEASE, STAGE 3A: ICD-10-CM

## 2022-09-16 DIAGNOSIS — Z79.4 TYPE 2 DIABETES MELLITUS WITH HYPERGLYCEMIA, WITH LONG-TERM CURRENT USE OF INSULIN: Primary | ICD-10-CM

## 2022-09-16 DIAGNOSIS — E11.65 TYPE 2 DIABETES MELLITUS WITH HYPERGLYCEMIA, WITH LONG-TERM CURRENT USE OF INSULIN: Primary | ICD-10-CM

## 2022-09-16 DIAGNOSIS — E11.69 MIXED DIABETIC HYPERLIPIDEMIA ASSOCIATED WITH TYPE 2 DIABETES MELLITUS: ICD-10-CM

## 2022-09-16 DIAGNOSIS — E78.2 MIXED DIABETIC HYPERLIPIDEMIA ASSOCIATED WITH TYPE 2 DIABETES MELLITUS: ICD-10-CM

## 2022-09-16 PROCEDURE — 95251 CONT GLUC MNTR ANALYSIS I&R: CPT | Performed by: INTERNAL MEDICINE

## 2022-09-16 PROCEDURE — 99214 OFFICE O/P EST MOD 30 MIN: CPT | Performed by: INTERNAL MEDICINE

## 2022-09-16 RX ORDER — EMPAGLIFLOZIN, METFORMIN HYDROCHLORIDE 10; 1000 MG/1; MG/1
1 TABLET, EXTENDED RELEASE ORAL
Qty: 30 TABLET | Refills: 11 | Status: SHIPPED | OUTPATIENT
Start: 2022-09-16 | End: 2023-03-16 | Stop reason: SDUPTHER

## 2022-09-16 RX ORDER — INSULIN ASPART INJECTION 100 [IU]/ML
INJECTION, SOLUTION SUBCUTANEOUS
Qty: 27 ML | Refills: 11 | Status: SHIPPED | OUTPATIENT
Start: 2022-09-16 | End: 2023-03-16 | Stop reason: SDUPTHER

## 2022-09-16 RX ORDER — INSULIN GLARGINE AND LIXISENATIDE 100; 33 U/ML; UG/ML
INJECTION, SOLUTION SUBCUTANEOUS
Qty: 18 ML | Refills: 11 | Status: SHIPPED | OUTPATIENT
Start: 2022-09-16 | End: 2023-03-16

## 2022-09-16 NOTE — PROGRESS NOTES
" Ellen Storey is a 67 y.o. female who presents for  evaluation of diabetes       HPI    Duration 10 years     Quality -  Now controlled     Severity -  severe    Complications - retinopathy , ckd stage III    Current symptoms/problems  none     Alleviating Factors: Compliance       Side Effects  none    Current diet  in general, a \"healthy\" diet      Current exercise walking    Current monitoring regimen: home blood tests - dexcom and at goal     Hypoglycemia none      PE    /60   Pulse 70   Ht 170.2 cm (67\")   Wt 102 kg (224 lb)   SpO2 98%   BMI 35.08 kg/m²   AOx3  No visible goiter  RRR  CTA  No Edema    Labs    Lab Results   Component Value Date    WBC 5.75 09/09/2022    HGB 14.3 09/09/2022    HCT 43.4 09/09/2022    MCV 91.0 09/09/2022     09/09/2022     Lab Results   Component Value Date    GLUCOSE 89 09/09/2022    BUN 22 09/09/2022    CREATININE 1.18 (H) 09/09/2022    EGFRIFAFRI 53 (L) 01/18/2022    BCR 18.6 09/09/2022    K 4.3 09/09/2022    CO2 22.8 09/09/2022    CALCIUM 9.6 09/09/2022    ALBUMIN 4.00 09/09/2022    AST 17 09/09/2022    ALT 15 09/09/2022         Assessment & Plan       ICD-10-CM ICD-9-CM   1. Type 2 diabetes mellitus with hyperglycemia, with long-term current use of insulin ([facility])  E11.65 250.00    Z79.4 790.29     V58.67   2. Mixed diabetic hyperlipidemia associated with type 2 diabetes mellitus ([facility])  E11.69 250.80    E78.2 272.2   3. Hypertension associated with diabetes ([facility])  E11.59 250.80    I15.2 401.9   4. Chronic kidney disease, stage 3a ([facility])  N18.31 585.3         I reviewed and summarized records from Isauro Prado MD from current year  and I reviewed / ordered labs.   From review of records :    Pt has type 2 diabetes , at goal         Glycemic Management:   Lab Results   Component Value Date    HGBA1C 6.90 (H) 09/09/2022    HGBA1C 7.50 (H) 03/14/2022    HGBA1C 8.20 (H) 12/06/2021     Lab Results   Component Value Date    GLUCOSE 89 09/09/2022    BUN 22 " "09/09/2022    CREATININE 1.18 (H) 09/09/2022    EGFRIFAFRI 53 (L) 01/18/2022    BCR 18.6 09/09/2022    K 4.3 09/09/2022    CO2 22.8 09/09/2022    CALCIUM 9.6 09/09/2022    ALBUMIN 4.00 09/09/2022    AST 17 09/09/2022    ALT 15 09/09/2022    ANIONGAP 11.2 09/09/2022     Lab Results   Component Value Date    WBC 5.75 09/09/2022    HGB 14.3 09/09/2022    HCT 43.4 09/09/2022    MCV 91.0 09/09/2022     09/09/2022             soliqua 55am   =================     synjardy one tablet with the first meal of the day   10/1000    ==========    Fiasp     12 to 30 that she has learned to estimate      dexcom , 2 week review, excellent type 2 diabetes control   Report scanned in       --    Dyslipidemia on lipitor     Lab Results   Component Value Date    CHOL 180 09/09/2022    TRIG 90 09/09/2022    HDL 57 09/09/2022     (H) 09/09/2022       --    HTN controlled on lisinopril regimen   /60   Pulse 70   Ht 170.2 cm (67\")   Wt 102 kg (224 lb)   SpO2 98%   BMI 35.08 kg/m²     --  Microvascular complications  ckd stage 3aA2  Albuminuria decreasing     On lisinopril 40 , jardiance and kerendia      ,     Retinopathy , stable, [place]  Left , injections    Mild neuropathy , not painful   She has plantar fasciitis and tennis ball rolling is working, her daughter dx this       No orders of the defined types were placed in this encounter.        A copy of my note was sent to Isauro Prado MD    Please see my above opinion and suggestions.           This document has been electronically signed by Efren Crow MD on September 16, 2022 08:21 CDT    "

## 2022-09-19 ENCOUNTER — SPECIALTY PHARMACY (OUTPATIENT)
Dept: ENDOCRINOLOGY | Facility: CLINIC | Age: 67
End: 2022-09-19

## 2022-09-22 DIAGNOSIS — Z79.4 TYPE 2 DIABETES MELLITUS WITH HYPERGLYCEMIA, WITH LONG-TERM CURRENT USE OF INSULIN: ICD-10-CM

## 2022-09-22 DIAGNOSIS — E11.65 TYPE 2 DIABETES MELLITUS WITH HYPERGLYCEMIA, WITH LONG-TERM CURRENT USE OF INSULIN: ICD-10-CM

## 2022-09-22 RX ORDER — PEN NEEDLE, DIABETIC 32GX 5/32"
NEEDLE, DISPOSABLE MISCELLANEOUS
Qty: 100 EACH | Refills: 0 | Status: SHIPPED | OUTPATIENT
Start: 2022-09-22 | End: 2022-11-14

## 2022-10-13 ENCOUNTER — DOCUMENTATION (OUTPATIENT)
Dept: ENDOCRINOLOGY | Facility: CLINIC | Age: 67
End: 2022-10-13

## 2022-10-13 ENCOUNTER — SPECIALTY PHARMACY (OUTPATIENT)
Dept: ENDOCRINOLOGY | Facility: CLINIC | Age: 67
End: 2022-10-13

## 2022-10-13 NOTE — PROGRESS NOTES
Specialty Pharmacy Refill Coordination Note     Ellen is a 67 y.o. female contacted today regarding refills of  dexcom  specialty medication(s).    Reviewed and verified with patient:       Specialty medication(s) and dose(s) confirmed: yes    Refill Questions    Flowsheet Row Most Recent Value   Changes to allergies? No   Changes to medications? No   New conditions since last clinic visit No   Unplanned office visit, urgent care, ED, or hospital admission in the last 4 weeks  No   How does patient/caregiver feel medication is working? Very good   Financial problems or insurance changes  No   Since the previous refill, were any specialty medication doses or scheduled injections missed or delayed?  No   Does this patient require a clinical escalation to a pharmacist? No          Delivery Questions    Flowsheet Row Most Recent Value   Delivery method FedEx   Delivery address correct? Yes   Delivery phone number [phone]   Number of medications in delivery 2   Medication being filled and delivered dexcom sensor, transmitter   Is there any medication that is due not being filled? No   Supplies needed? No supplies needed   Cooler needed? No   Do any medications need mixed or dated? No   Questions or concerns for the pharmacist? No   Are any medications first time fills? No                 Follow-up: 90 day(s)     Latanya Zimmer, Pharmacy Technician  Specialty Pharmacy Technician

## 2022-11-01 ENCOUNTER — SPECIALTY PHARMACY (OUTPATIENT)
Dept: ENDOCRINOLOGY | Facility: CLINIC | Age: 67
End: 2022-11-01

## 2022-11-01 RX ORDER — FINERENONE 20 MG/1
20 TABLET, FILM COATED ORAL DAILY
Qty: 30 TABLET | Refills: 11 | Status: SHIPPED | OUTPATIENT
Start: 2022-11-01

## 2022-11-01 NOTE — PROGRESS NOTES
Specialty Pharmacy Refill Coordination Note     Ellen is a 67 y.o. female contacted today regarding refills of  kerendia specialty medication(s).    Reviewed and verified with patient:  Allergies       Specialty medication(s) and dose(s) confirmed: yes    Refill Questions    Flowsheet Row Most Recent Value   Changes to allergies? No   Changes to medications? No   New conditions since last clinic visit No   Unplanned office visit, urgent care, ED, or hospital admission in the last 4 weeks  No   How does patient/caregiver feel medication is working? Very good   Financial problems or insurance changes  No   Since the previous refill, were any specialty medication doses or scheduled injections missed or delayed?  No   Does this patient require a clinical escalation to a pharmacist? No          Delivery Questions    Flowsheet Row Most Recent Value   Delivery method FedEx   Delivery address correct? Yes   Delivery phone number [phone]   Number of medications in delivery 1   Medication being filled and delivered kerendia   Doses left of specialty medications 8   Is there any medication that is due not being filled? No   Supplies needed? No supplies needed   Cooler needed? No   Do any medications need mixed or dated? No   Additional comments 0.00   Questions or concerns for the pharmacist? No   Are any medications first time fills? No        No issues to report, no new meds or allergies.  Follow up at next appointment.          Follow-up: 3 months.      Sriram Longoria  Specialty Pharmacy Technician

## 2022-11-12 DIAGNOSIS — E11.65 TYPE 2 DIABETES MELLITUS WITH HYPERGLYCEMIA, WITH LONG-TERM CURRENT USE OF INSULIN: ICD-10-CM

## 2022-11-12 DIAGNOSIS — Z79.4 TYPE 2 DIABETES MELLITUS WITH HYPERGLYCEMIA, WITH LONG-TERM CURRENT USE OF INSULIN: ICD-10-CM

## 2022-11-14 RX ORDER — PEN NEEDLE, DIABETIC 32GX 5/32"
NEEDLE, DISPOSABLE MISCELLANEOUS
Qty: 100 EACH | Refills: 0 | Status: SHIPPED | OUTPATIENT
Start: 2022-11-14 | End: 2023-01-10

## 2022-12-28 ENCOUNTER — SPECIALTY PHARMACY (OUTPATIENT)
Dept: ENDOCRINOLOGY | Facility: CLINIC | Age: 67
End: 2022-12-28

## 2022-12-28 RX ORDER — PROCHLORPERAZINE 25 MG/1
SUPPOSITORY RECTAL
Qty: 3 EACH | Refills: 11 | Status: SHIPPED | OUTPATIENT
Start: 2022-12-28

## 2022-12-28 RX ORDER — PROCHLORPERAZINE 25 MG/1
1 SUPPOSITORY RECTAL
Qty: 1 EACH | Refills: 4 | Status: SHIPPED | OUTPATIENT
Start: 2022-12-28

## 2023-01-10 DIAGNOSIS — E11.65 TYPE 2 DIABETES MELLITUS WITH HYPERGLYCEMIA, WITH LONG-TERM CURRENT USE OF INSULIN: ICD-10-CM

## 2023-01-10 DIAGNOSIS — Z79.4 TYPE 2 DIABETES MELLITUS WITH HYPERGLYCEMIA, WITH LONG-TERM CURRENT USE OF INSULIN: ICD-10-CM

## 2023-01-10 RX ORDER — PEN NEEDLE, DIABETIC 32GX 5/32"
NEEDLE, DISPOSABLE MISCELLANEOUS
Qty: 100 EACH | Refills: 0 | Status: SHIPPED | OUTPATIENT
Start: 2023-01-10 | End: 2023-02-06

## 2023-01-26 ENCOUNTER — SPECIALTY PHARMACY (OUTPATIENT)
Dept: ENDOCRINOLOGY | Facility: CLINIC | Age: 68
End: 2023-01-26
Payer: COMMERCIAL

## 2023-01-26 NOTE — PROGRESS NOTES
Specialty Pharmacy Refill Coordination Note     Ellen is a 67 y.o. female contacted today regarding refills of  kerendia  specialty medication(s).    Reviewed and verified with patient:       Specialty medication(s) and dose(s) confirmed: yes    Refill Questions    Flowsheet Row Most Recent Value   Changes to allergies? No   Changes to medications? No   New conditions since last clinic visit No   Unplanned office visit, urgent care, ED, or hospital admission in the last 4 weeks  No   How does patient/caregiver feel medication is working? Very good   Financial problems or insurance changes  No   Since the previous refill, were any specialty medication doses or scheduled injections missed or delayed?  No   Does this patient require a clinical escalation to a pharmacist? No          Delivery Questions    Flowsheet Row Most Recent Value   Delivery method FedEx   Delivery address correct? Yes   Delivery phone number [phone]   Number of medications in delivery 1   Medication being filled and delivered kerendia   Is there any medication that is due not being filled? No   Cooler needed? No   Do any medications need mixed or dated? No   Questions or concerns for the pharmacist? No   Are any medications first time fills? No                 Follow-up: 30 day(s)     Latanya Zimmer, Pharmacy Technician  Specialty Pharmacy Technician

## 2023-02-04 DIAGNOSIS — Z79.4 TYPE 2 DIABETES MELLITUS WITH HYPERGLYCEMIA, WITH LONG-TERM CURRENT USE OF INSULIN: ICD-10-CM

## 2023-02-04 DIAGNOSIS — E11.65 TYPE 2 DIABETES MELLITUS WITH HYPERGLYCEMIA, WITH LONG-TERM CURRENT USE OF INSULIN: ICD-10-CM

## 2023-02-06 ENCOUNTER — SPECIALTY PHARMACY (OUTPATIENT)
Dept: ENDOCRINOLOGY | Facility: CLINIC | Age: 68
End: 2023-02-06
Payer: COMMERCIAL

## 2023-02-06 RX ORDER — PEN NEEDLE, DIABETIC 32GX 5/32"
NEEDLE, DISPOSABLE MISCELLANEOUS
Qty: 100 EACH | Refills: 0 | Status: SHIPPED | OUTPATIENT
Start: 2023-02-06 | End: 2023-03-09

## 2023-02-06 NOTE — PROGRESS NOTES
Specialty Pharmacy Refill Coordination Note     Ellen is a 67 y.o. female    Filled and shipped a 90 days supply on 01/30   Latanya Zimmer, Pharmacy Technician  Specialty Pharmacy Technician

## 2023-02-07 ENCOUNTER — SPECIALTY PHARMACY (OUTPATIENT)
Dept: ENDOCRINOLOGY | Facility: CLINIC | Age: 68
End: 2023-02-07
Payer: COMMERCIAL

## 2023-03-09 DIAGNOSIS — E11.65 TYPE 2 DIABETES MELLITUS WITH HYPERGLYCEMIA, WITH LONG-TERM CURRENT USE OF INSULIN: ICD-10-CM

## 2023-03-09 DIAGNOSIS — Z79.4 TYPE 2 DIABETES MELLITUS WITH HYPERGLYCEMIA, WITH LONG-TERM CURRENT USE OF INSULIN: ICD-10-CM

## 2023-03-09 RX ORDER — PEN NEEDLE, DIABETIC 32GX 5/32"
NEEDLE, DISPOSABLE MISCELLANEOUS
Qty: 100 EACH | Refills: 0 | Status: SHIPPED | OUTPATIENT
Start: 2023-03-09 | End: 2023-03-16

## 2023-03-14 ENCOUNTER — LAB (OUTPATIENT)
Dept: LAB | Facility: HOSPITAL | Age: 68
End: 2023-03-14
Payer: COMMERCIAL

## 2023-03-14 DIAGNOSIS — I15.2 HYPERTENSION ASSOCIATED WITH DIABETES: ICD-10-CM

## 2023-03-14 DIAGNOSIS — E11.65 TYPE 2 DIABETES MELLITUS WITH HYPERGLYCEMIA, WITH LONG-TERM CURRENT USE OF INSULIN: ICD-10-CM

## 2023-03-14 DIAGNOSIS — Z79.4 TYPE 2 DIABETES MELLITUS WITH HYPERGLYCEMIA, WITH LONG-TERM CURRENT USE OF INSULIN: ICD-10-CM

## 2023-03-14 DIAGNOSIS — E78.2 MIXED DIABETIC HYPERLIPIDEMIA ASSOCIATED WITH TYPE 2 DIABETES MELLITUS: ICD-10-CM

## 2023-03-14 DIAGNOSIS — E11.59 HYPERTENSION ASSOCIATED WITH DIABETES: ICD-10-CM

## 2023-03-14 DIAGNOSIS — E11.69 MIXED DIABETIC HYPERLIPIDEMIA ASSOCIATED WITH TYPE 2 DIABETES MELLITUS: ICD-10-CM

## 2023-03-14 DIAGNOSIS — N18.31 CHRONIC KIDNEY DISEASE, STAGE 3A: ICD-10-CM

## 2023-03-14 LAB
ALBUMIN SERPL-MCNC: 3.8 G/DL (ref 3.5–5.2)
ALBUMIN UR-MCNC: 4.6 MG/DL
ALBUMIN/GLOB SERPL: 1.4 G/DL
ALP SERPL-CCNC: 126 U/L (ref 39–117)
ALT SERPL W P-5'-P-CCNC: 14 U/L (ref 1–33)
ANION GAP SERPL CALCULATED.3IONS-SCNC: 8 MMOL/L (ref 5–15)
AST SERPL-CCNC: 18 U/L (ref 1–32)
BASOPHILS # BLD AUTO: 0.08 10*3/MM3 (ref 0–0.2)
BASOPHILS NFR BLD AUTO: 1.3 % (ref 0–1.5)
BILIRUB SERPL-MCNC: 0.3 MG/DL (ref 0–1.2)
BUN SERPL-MCNC: 29 MG/DL (ref 8–23)
BUN/CREAT SERPL: 23.8 (ref 7–25)
CALCIUM SPEC-SCNC: 9.4 MG/DL (ref 8.6–10.5)
CHLORIDE SERPL-SCNC: 105 MMOL/L (ref 98–107)
CHOLEST SERPL-MCNC: 145 MG/DL (ref 0–200)
CO2 SERPL-SCNC: 25 MMOL/L (ref 22–29)
CREAT SERPL-MCNC: 1.22 MG/DL (ref 0.57–1)
CREAT UR-MCNC: 99.7 MG/DL
DEPRECATED RDW RBC AUTO: 50 FL (ref 37–54)
EGFRCR SERPLBLD CKD-EPI 2021: 48.1 ML/MIN/1.73
EOSINOPHIL # BLD AUTO: 0.29 10*3/MM3 (ref 0–0.4)
EOSINOPHIL NFR BLD AUTO: 4.6 % (ref 0.3–6.2)
ERYTHROCYTE [DISTWIDTH] IN BLOOD BY AUTOMATED COUNT: 14.8 % (ref 12.3–15.4)
GLOBULIN UR ELPH-MCNC: 2.8 GM/DL
GLUCOSE SERPL-MCNC: 91 MG/DL (ref 65–99)
HBA1C MFR BLD: 7.5 % (ref 4.8–5.6)
HCT VFR BLD AUTO: 45.8 % (ref 34–46.6)
HDLC SERPL-MCNC: 49 MG/DL (ref 40–60)
HGB BLD-MCNC: 14.5 G/DL (ref 12–15.9)
IMM GRANULOCYTES # BLD AUTO: 0.02 10*3/MM3 (ref 0–0.05)
IMM GRANULOCYTES NFR BLD AUTO: 0.3 % (ref 0–0.5)
LDLC SERPL CALC-MCNC: 79 MG/DL (ref 0–100)
LDLC/HDLC SERPL: 1.58 {RATIO}
LYMPHOCYTES # BLD AUTO: 2.52 10*3/MM3 (ref 0.7–3.1)
LYMPHOCYTES NFR BLD AUTO: 40.1 % (ref 19.6–45.3)
MCH RBC QN AUTO: 29 PG (ref 26.6–33)
MCHC RBC AUTO-ENTMCNC: 31.7 G/DL (ref 31.5–35.7)
MCV RBC AUTO: 91.6 FL (ref 79–97)
MICROALBUMIN/CREAT UR: 46.1 MG/G
MONOCYTES # BLD AUTO: 0.63 10*3/MM3 (ref 0.1–0.9)
MONOCYTES NFR BLD AUTO: 10 % (ref 5–12)
NEUTROPHILS NFR BLD AUTO: 2.75 10*3/MM3 (ref 1.7–7)
NEUTROPHILS NFR BLD AUTO: 43.7 % (ref 42.7–76)
NRBC BLD AUTO-RTO: 0 /100 WBC (ref 0–0.2)
PLATELET # BLD AUTO: 227 10*3/MM3 (ref 140–450)
PMV BLD AUTO: 12.3 FL (ref 6–12)
POTASSIUM SERPL-SCNC: 4.5 MMOL/L (ref 3.5–5.2)
PROT SERPL-MCNC: 6.6 G/DL (ref 6–8.5)
RBC # BLD AUTO: 5 10*6/MM3 (ref 3.77–5.28)
SODIUM SERPL-SCNC: 138 MMOL/L (ref 136–145)
TRIGL SERPL-MCNC: 93 MG/DL (ref 0–150)
TSH SERPL DL<=0.05 MIU/L-ACNC: 1.31 UIU/ML (ref 0.27–4.2)
VIT B12 BLD-MCNC: 719 PG/ML (ref 211–946)
VLDLC SERPL-MCNC: 17 MG/DL (ref 5–40)
WBC NRBC COR # BLD: 6.29 10*3/MM3 (ref 3.4–10.8)

## 2023-03-14 PROCEDURE — 80050 GENERAL HEALTH PANEL: CPT

## 2023-03-14 PROCEDURE — 82043 UR ALBUMIN QUANTITATIVE: CPT

## 2023-03-14 PROCEDURE — 82570 ASSAY OF URINE CREATININE: CPT

## 2023-03-14 PROCEDURE — 80061 LIPID PANEL: CPT

## 2023-03-14 PROCEDURE — 82607 VITAMIN B-12: CPT

## 2023-03-14 PROCEDURE — 83036 HEMOGLOBIN GLYCOSYLATED A1C: CPT

## 2023-03-16 ENCOUNTER — OFFICE VISIT (OUTPATIENT)
Dept: ENDOCRINOLOGY | Facility: CLINIC | Age: 68
End: 2023-03-16
Payer: COMMERCIAL

## 2023-03-16 VITALS
WEIGHT: 232 LBS | SYSTOLIC BLOOD PRESSURE: 140 MMHG | HEART RATE: 59 BPM | OXYGEN SATURATION: 97 % | HEIGHT: 67 IN | BODY MASS INDEX: 36.41 KG/M2 | DIASTOLIC BLOOD PRESSURE: 60 MMHG

## 2023-03-16 DIAGNOSIS — N18.31 CHRONIC KIDNEY DISEASE, STAGE 3A: ICD-10-CM

## 2023-03-16 DIAGNOSIS — E11.21 TYPE 2 DIABETES WITH NEPHROPATHY: Primary | ICD-10-CM

## 2023-03-16 DIAGNOSIS — Z79.4 TYPE 2 DIABETES MELLITUS WITH HYPERGLYCEMIA, WITH LONG-TERM CURRENT USE OF INSULIN: ICD-10-CM

## 2023-03-16 DIAGNOSIS — E11.69 MIXED DIABETIC HYPERLIPIDEMIA ASSOCIATED WITH TYPE 2 DIABETES MELLITUS: ICD-10-CM

## 2023-03-16 DIAGNOSIS — E11.65 TYPE 2 DIABETES MELLITUS WITH HYPERGLYCEMIA, WITH LONG-TERM CURRENT USE OF INSULIN: ICD-10-CM

## 2023-03-16 DIAGNOSIS — E11.59 HYPERTENSION ASSOCIATED WITH DIABETES: ICD-10-CM

## 2023-03-16 DIAGNOSIS — E78.2 MIXED DIABETIC HYPERLIPIDEMIA ASSOCIATED WITH TYPE 2 DIABETES MELLITUS: ICD-10-CM

## 2023-03-16 DIAGNOSIS — I15.2 HYPERTENSION ASSOCIATED WITH DIABETES: ICD-10-CM

## 2023-03-16 DIAGNOSIS — E55.9 VITAMIN D DEFICIENCY: ICD-10-CM

## 2023-03-16 PROCEDURE — 99214 OFFICE O/P EST MOD 30 MIN: CPT | Performed by: INTERNAL MEDICINE

## 2023-03-16 PROCEDURE — 95251 CONT GLUC MNTR ANALYSIS I&R: CPT | Performed by: INTERNAL MEDICINE

## 2023-03-16 RX ORDER — EMPAGLIFLOZIN, METFORMIN HYDROCHLORIDE 10; 1000 MG/1; MG/1
1 TABLET, EXTENDED RELEASE ORAL
Qty: 30 TABLET | Refills: 11 | Status: SHIPPED | OUTPATIENT
Start: 2023-03-16

## 2023-03-16 RX ORDER — INSULIN ASPART INJECTION 100 [IU]/ML
30 INJECTION, SOLUTION SUBCUTANEOUS
Qty: 27 ML | Refills: 11 | Status: SHIPPED | OUTPATIENT
Start: 2023-03-16

## 2023-03-16 RX ORDER — TIRZEPATIDE 5 MG/.5ML
5 INJECTION, SOLUTION SUBCUTANEOUS
Qty: 2 ML | Refills: 11 | Status: SHIPPED | OUTPATIENT
Start: 2023-03-16

## 2023-03-16 RX ORDER — INSULIN DEGLUDEC INJECTION 100 U/ML
50 INJECTION, SOLUTION SUBCUTANEOUS NIGHTLY
Qty: 15 ML | Refills: 11 | Status: SHIPPED | OUTPATIENT
Start: 2023-03-16

## 2023-03-16 RX ORDER — PEN NEEDLE, DIABETIC 30 GX3/16"
1 NEEDLE, DISPOSABLE MISCELLANEOUS 4 TIMES DAILY
Qty: 120 EACH | Refills: 11 | Status: SHIPPED | OUTPATIENT
Start: 2023-03-16

## 2023-03-16 RX ORDER — GLUCAGON 3 MG/1
1 POWDER NASAL AS NEEDED
Qty: 2 EACH | Refills: 11 | Status: SHIPPED | OUTPATIENT
Start: 2023-03-16

## 2023-03-16 NOTE — PROGRESS NOTES
" Ellen Storey is a 68 y.o. female who presents for  evaluation of diabetes       HPI    Duration 10 years     Quality -  Now controlled     Severity -  severe    Complications - retinopathy , ckd stage III    Current symptoms/problems  none     Alleviating Factors: Compliance       Side Effects  none    Current diet  in general, a \"healthy\" diet      Current exercise walking    Current monitoring regimen: home blood tests - dexcom and at goal     Hypoglycemia none      PE    /60   Pulse 59   Ht 170.2 cm (67\")   Wt 105 kg (232 lb)   SpO2 97%   BMI 36.34 kg/m²   AOx3  No visible goiter  RRR  CTA  No Edema    Labs    Lab Results   Component Value Date    WBC 6.29 03/14/2023    HGB 14.5 03/14/2023    HCT 45.8 03/14/2023    MCV 91.6 03/14/2023     03/14/2023     Lab Results   Component Value Date    GLUCOSE 91 03/14/2023    BUN 29 (H) 03/14/2023    CREATININE 1.22 (H) 03/14/2023    EGFRIFAFRI 53 (L) 01/18/2022    BCR 23.8 03/14/2023    K 4.5 03/14/2023    CO2 25.0 03/14/2023    CALCIUM 9.4 03/14/2023    ALBUMIN 3.8 03/14/2023    AST 18 03/14/2023    ALT 14 03/14/2023     Lab Results   Component Value Date    EGFR 48.1 (L) 03/14/2023    EGFR 50.4 (L) 09/09/2022    EGFR 53.1 (L) 03/14/2022     Lab Results   Component Value Date    MALBCRERATIO 46.1 03/14/2023    MALBCRERATIO 84.8 09/09/2022    MALBCRERATIO 104.4 03/14/2022         Assessment & Plan       ICD-10-CM ICD-9-CM   1. Type 2 diabetes mellitus with hyperglycemia, with long-term current use of insulin ([facility])  E11.65 250.00    Z79.4 790.29     V58.67   2. Mixed diabetic hyperlipidemia associated with type 2 diabetes mellitus ([facility])  E11.69 250.80    E78.2 272.2   3. Hypertension associated with diabetes ([facility])  E11.59 250.80    I15.2 401.9   4. Chronic kidney disease, stage 3a ([facility])  N18.31 585.3   5. Vitamin D deficiency  E55.9 268.9         I reviewed and summarized records from Isauro Prado MD from current year  and I reviewed / ordered labs. " "  From review of records :    Pt has type 2 diabetes , at goal         Glycemic Management:   Lab Results   Component Value Date    HGBA1C 7.50 (H) 03/14/2023    HGBA1C 6.90 (H) 09/09/2022    HGBA1C 7.50 (H) 03/14/2022     Lab Results   Component Value Date    GLUCOSE 91 03/14/2023    BUN 29 (H) 03/14/2023    CREATININE 1.22 (H) 03/14/2023    EGFRIFAFRI 53 (L) 01/18/2022    BCR 23.8 03/14/2023    K 4.5 03/14/2023    CO2 25.0 03/14/2023    CALCIUM 9.4 03/14/2023    ALBUMIN 3.8 03/14/2023    AST 18 03/14/2023    ALT 14 03/14/2023    ANIONGAP 8.0 03/14/2023     Lab Results   Component Value Date    WBC 6.29 03/14/2023    HGB 14.5 03/14/2023    HCT 45.8 03/14/2023    MCV 91.6 03/14/2023     03/14/2023          Ambulatory Glucose Profile Report    Days Analyzed : 2 week period ending on 03/16/23      Continuous Glucose Monitory Device:  Dexcom G6     - 1% very high target range  - 15% high target range  - 83% in target range  - 1% below target range  - GMI 6.7 %  - Average glucose 143 mg/dl    Interpretation : Diabetes Type 2 well controlled          soliqua 50am   Change to tresiba 40 and mounjaro 5       Soliqua 50 in am will be replaced by     Tresiba and mounjaro     Tresiba, start with 40 units every morning   You adjust based on your waking sugar  Less than 100 , back off 5 units  More than 150 for 5 days, increase by 5    -----    Mounjaro, start with 5 mg weekly   This will help lose weight  This can be increased by 2.5 mg weekly to a max dose of 15 mg     =================     synjardy one tablet with the first meal of the day   10/1000    ==========    Fiasp     12 to 30 that she has learned to estimate          --    Dyslipidemia on lipitor     Lab Results   Component Value Date    CHOL 145 03/14/2023    TRIG 93 03/14/2023    HDL 49 03/14/2023    LDL 79 03/14/2023       --    HTN controlled on lisinopril regimen   /60   Pulse 59   Ht 170.2 cm (67\")   Wt 105 kg (232 lb)   SpO2 97%   BMI 36.34 " kg/m²     --  Microvascular complications  ckd stage 3aA2  Albuminuria decreasing     On lisinopril 40 , jardiance and kerendia      Lab Results   Component Value Date    EGFR 48.1 (L) 2023    EGFR 50.4 (L) 2022    EGFR 53.1 (L) 2022     Lab Results   Component Value Date    MALBCRERATIO 46.1 2023    MALBCRERATIO 84.8 2022    MALBCRERATIO 104.4 2022         ,     Retinopathy , stable, [place]  Left , injections    Mild neuropathy , not painful   She has plantar fasciitis and tennis ball rolling is working, her daughter dx this     New Medications Ordered This Visit   Medications   • insulin degludec (Tresiba FlexTouch) 100 UNIT/ML solution pen-injector injection     Sig: Inject 50 Units under the skin into the appropriate area as directed Every Night.     Dispense:  15 mL     Refill:  11   • Tirzepatide (Mounjaro) 5 MG/0.5ML solution pen-injector     Sig: Inject 0.5 mL under the skin into the appropriate area as directed Every 7 (Seven) Days. 5 mg subcutaneously weekly     Dispense:  2 mL     Refill:  11   • Insulin Aspart, w/Niacinamide, (Fiasp FlexTouch) 100 UNIT/ML solution pen-injector     Si units with meal     Dispense:  27 mL     Refill:  11   • Empagliflozin-metFORMIN HCl ER (Synjardy XR)  MG tablet sustained-release 24 hour     Sig: Take 1 tablet by mouth Daily With Breakfast.     Dispense:  30 tablet     Refill:  11   • Insulin Pen Needle (Pen Needles) 32G X 4 MM misc     Si each 4 (Four) Times a Day. Use 4 x daily, Dx code E11.65     Dispense:  120 each     Refill:  11   • Glucagon (Baqsimi One Pack) 3 MG/DOSE powder     Si each into the nostril(s) as directed by provider As Needed (Hypoglycemia). Apply intranasal if hypoglycemia     Dispense:  2 each     Refill:  11       Orders Placed This Encounter   Procedures   • CBC Auto Differential   • Comprehensive Metabolic Panel   • Hemoglobin A1c   • Lipid Panel   • Microalbumin / Creatinine Urine Ratio  - Urine, Clean Catch   • TSH   • Vitamin B12             This document has been electronically signed by Efren Crow MD on March 16, 2023 08:53 CDT

## 2023-03-16 NOTE — PATIENT INSTRUCTIONS
Soliqua 50 in am will be replaced by     Tresiba and mounjaro     Tresiba, start with 40 units every morning   You adjust based on your waking sugar  Less than 100 , back off 5 units  More than 150 for 5 days, increase by 5    -----    Mounjaro, start with 5 mg weekly   This will help lose weight  This can be increased by 2.5 mg weekly to a max dose of 15 mg

## 2023-03-20 ENCOUNTER — DOCUMENTATION (OUTPATIENT)
Dept: ENDOCRINOLOGY | Facility: CLINIC | Age: 68
End: 2023-03-20
Payer: COMMERCIAL

## 2023-03-27 ENCOUNTER — SPECIALTY PHARMACY (OUTPATIENT)
Dept: ENDOCRINOLOGY | Facility: CLINIC | Age: 68
End: 2023-03-27
Payer: COMMERCIAL

## 2023-03-27 RX ORDER — PROMETHAZINE HYDROCHLORIDE 12.5 MG/1
12.5 TABLET ORAL EVERY 6 HOURS PRN
Qty: 45 TABLET | Refills: 3 | Status: SHIPPED | OUTPATIENT
Start: 2023-03-27

## 2023-04-04 ENCOUNTER — SPECIALTY PHARMACY (OUTPATIENT)
Dept: ENDOCRINOLOGY | Facility: CLINIC | Age: 68
End: 2023-04-04
Payer: COMMERCIAL

## 2023-04-04 NOTE — PROGRESS NOTES
Specialty Pharmacy Refill Coordination Note     Ellen is a 68 y.o. female synjardy filled 03/27 and shipped rw                  Latanya Zimmer, Pharmacy Technician  Specialty Pharmacy Technician

## 2023-04-19 ENCOUNTER — SPECIALTY PHARMACY (OUTPATIENT)
Dept: ENDOCRINOLOGY | Facility: CLINIC | Age: 68
End: 2023-04-19
Payer: COMMERCIAL

## 2023-04-19 RX ORDER — TIRZEPATIDE 7.5 MG/.5ML
7.5 INJECTION, SOLUTION SUBCUTANEOUS WEEKLY
Qty: 2 ML | Refills: 6 | Status: SHIPPED | OUTPATIENT
Start: 2023-04-19

## 2023-04-19 NOTE — PROGRESS NOTES
Specialty Pharmacy Refill Coordination Note     Ellen is a 68 y.o. female contacted today regarding refills of  dexcom transmitter, sensor and  specialty medication(s).    Reviewed and verified with patient:  Allergies       Specialty medication(s) and dose(s) confirmed: yes    Refill Questions    Flowsheet Row Most Recent Value   Changes to allergies? No   Changes to medications? No   New conditions since last clinic visit No   Unplanned office visit, urgent care, ED, or hospital admission in the last 4 weeks  No   How does patient/caregiver feel medication is working? Very good   Financial problems or insurance changes  No   Since the previous refill, were any specialty medication doses or scheduled injections missed or delayed?  No   Does this patient require a clinical escalation to a pharmacist? No          Delivery Questions    Flowsheet Row Most Recent Value   Delivery method FedEx   Delivery address correct? Yes   Delivery phone number [phone]   Number of medications in delivery 3   Medication being filled and delivered dexcom transmitter, sensor and mounjaro 7.5   Is there any medication that is due not being filled? No   Cooler needed? No   Do any medications need mixed or dated? No   Copay form of payment Credit card on file   Additional comments 0.00   Questions or concerns for the pharmacist? No   Are any medications first time fills? No        Would like to go ahead with the increase on the 7.5mg of mounjaro, also sending dexcom transmitter and sensors tomorrow.        Follow-up: 3 months.      Sriram Longoria  Specialty Pharmacy Technician

## 2023-05-12 ENCOUNTER — SPECIALTY PHARMACY (OUTPATIENT)
Dept: ENDOCRINOLOGY | Facility: CLINIC | Age: 68
End: 2023-05-12
Payer: COMMERCIAL

## 2023-05-12 NOTE — PROGRESS NOTES
Specialty Pharmacy Refill Coordination Note     Ellen is a 68 y.o. female contacted today regarding refills of  mounjaro and tresiba specialty medication(s).    Reviewed and verified with patient:  Allergies       Specialty medication(s) and dose(s) confirmed: yes    Refill Questions    Flowsheet Row Most Recent Value   Changes to allergies? No   Changes to medications? No   New conditions since last clinic visit No   Unplanned office visit, urgent care, ED, or hospital admission in the last 4 weeks  No   How does patient/caregiver feel medication is working? Very good   Financial problems or insurance changes  No   Since the previous refill, were any specialty medication doses or scheduled injections missed or delayed?  No   Does this patient require a clinical escalation to a pharmacist? No          Delivery Questions    Flowsheet Row Most Recent Value   Delivery method FedEx   Delivery address correct? Yes   Number of medications in delivery 2   Medication being filled and delivered mounjaro and tresiba   Is there any medication that is due not being filled? No   Supplies needed? No supplies needed   Cooler needed? Yes   Do any medications need mixed or dated? No   Copay form of payment Credit card on file   Questions or concerns for the pharmacist? No   Are any medications first time fills? No   Shipment status Cooler packed          Pt is increasing the mounjaro and refilling tresiba. Mialing out Monday.       Follow-up: 1 month     Sriram Longoria  Specialty Pharmacy Technician

## 2023-06-06 ENCOUNTER — SPECIALTY PHARMACY (OUTPATIENT)
Dept: ENDOCRINOLOGY | Facility: CLINIC | Age: 68
End: 2023-06-06
Payer: COMMERCIAL

## 2023-06-06 NOTE — PROGRESS NOTES
Specialty Pharmacy Refill Coordination Note     Ellen is a 68 y.o. female contacted today regarding refills of  mounjaro 10mg specialty medication(s).    Reviewed and verified with patient:       Specialty medication(s) and dose(s) confirmed: yes    Refill Questions      Flowsheet Row Most Recent Value   Changes to allergies? No   Changes to medications? No   Financial problems or insurance changes  No   Since the previous refill, were any specialty medication doses or scheduled injections missed or delayed?  No   Does this patient require a clinical escalation to a pharmacist? No            Delivery Questions      Flowsheet Row Most Recent Value   Delivery method [employer]   Delivery address correct? Yes   Delivery phone number [phone]   Number of medications in delivery 1   Medication being filled and delivered mounjaro 10   Copay form of payment Credit card on file   Questions or concerns for the pharmacist? No            Pt would like to remain on mounjaro 10mg at this time.        Follow-up: 30 day(s)     Latanya Zimmer, Pharmacy Technician  Specialty Pharmacy Technician

## 2023-08-01 ENCOUNTER — DOCUMENTATION (OUTPATIENT)
Dept: ENDOCRINOLOGY | Facility: CLINIC | Age: 68
End: 2023-08-01
Payer: COMMERCIAL

## 2023-08-01 ENCOUNTER — SPECIALTY PHARMACY (OUTPATIENT)
Dept: ENDOCRINOLOGY | Facility: CLINIC | Age: 68
End: 2023-08-01
Payer: COMMERCIAL

## 2023-08-14 ENCOUNTER — SPECIALTY PHARMACY (OUTPATIENT)
Dept: ENDOCRINOLOGY | Facility: CLINIC | Age: 68
End: 2023-08-14
Payer: COMMERCIAL

## 2023-08-14 NOTE — PROGRESS NOTES
Specialty Pharmacy Refill Coordination Note     Ellen is a 68 y.o. female contacted today regarding refills of   specialty medication(s).    Reviewed and verified with patient:   Specialty medication(s) and dose(s) confirmed: yes    Refill Questions      Flowsheet Row Most Recent Value   Changes to allergies? No   Changes to medications? No   New conditions since last clinic visit No   Unplanned office visit, urgent care, ED, or hospital admission in the last 4 weeks  No   How does patient/caregiver feel medication is working? Very good   Financial problems or insurance changes  No   Since the previous refill, were any specialty medication doses or scheduled injections missed or delayed?  No   Does this patient require a clinical escalation to a pharmacist? No            Delivery Questions      Flowsheet Row Most Recent Value   Delivery method [employer]   Delivery address correct? Yes   Number of medications in delivery 5   Medication being filled and delivered mounjaro, synjardy, tresiba, pen needles, kerendia   Is there any medication that is due not being filled? No   Supplies needed? No supplies needed   Cooler needed? Yes   Do any medications need mixed or dated? No   Copay form of payment Credit card on file   Questions or concerns for the pharmacist? No   Are any medications first time fills? No          Insurance is finally working properly and we are going to send out refills on all her meds listed above.            Follow-up: 1  Month.     Sriram Longoria  Specialty Pharmacy Technician

## 2023-09-11 ENCOUNTER — LAB (OUTPATIENT)
Dept: LAB | Facility: HOSPITAL | Age: 68
End: 2023-09-11
Payer: COMMERCIAL

## 2023-09-12 ENCOUNTER — LAB (OUTPATIENT)
Dept: LAB | Facility: HOSPITAL | Age: 68
End: 2023-09-12
Payer: MEDICARE

## 2023-09-12 DIAGNOSIS — E55.9 VITAMIN D DEFICIENCY: ICD-10-CM

## 2023-09-12 DIAGNOSIS — E78.2 MIXED DIABETIC HYPERLIPIDEMIA ASSOCIATED WITH TYPE 2 DIABETES MELLITUS: ICD-10-CM

## 2023-09-12 DIAGNOSIS — E11.59 HYPERTENSION ASSOCIATED WITH DIABETES: ICD-10-CM

## 2023-09-12 DIAGNOSIS — E11.21 TYPE 2 DIABETES WITH NEPHROPATHY: ICD-10-CM

## 2023-09-12 DIAGNOSIS — I15.2 HYPERTENSION ASSOCIATED WITH DIABETES: ICD-10-CM

## 2023-09-12 DIAGNOSIS — E11.69 MIXED DIABETIC HYPERLIPIDEMIA ASSOCIATED WITH TYPE 2 DIABETES MELLITUS: ICD-10-CM

## 2023-09-12 DIAGNOSIS — N18.31 CHRONIC KIDNEY DISEASE, STAGE 3A: ICD-10-CM

## 2023-09-12 LAB
ALBUMIN SERPL-MCNC: 3.8 G/DL (ref 3.5–5.2)
ALBUMIN UR-MCNC: 2.8 MG/DL
ALBUMIN/GLOB SERPL: 1.1 G/DL
ALP SERPL-CCNC: 121 U/L (ref 39–117)
ALT SERPL W P-5'-P-CCNC: 13 U/L (ref 1–33)
ANION GAP SERPL CALCULATED.3IONS-SCNC: 13 MMOL/L (ref 5–15)
AST SERPL-CCNC: 14 U/L (ref 1–32)
BASOPHILS # BLD AUTO: 0.08 10*3/MM3 (ref 0–0.2)
BASOPHILS NFR BLD AUTO: 1.3 % (ref 0–1.5)
BILIRUB SERPL-MCNC: 0.6 MG/DL (ref 0–1.2)
BUN SERPL-MCNC: 27 MG/DL (ref 8–23)
BUN/CREAT SERPL: 18.1 (ref 7–25)
CALCIUM SPEC-SCNC: 9.9 MG/DL (ref 8.6–10.5)
CHLORIDE SERPL-SCNC: 101 MMOL/L (ref 98–107)
CHOLEST SERPL-MCNC: 120 MG/DL (ref 0–200)
CO2 SERPL-SCNC: 24 MMOL/L (ref 22–29)
CREAT SERPL-MCNC: 1.49 MG/DL (ref 0.57–1)
CREAT UR-MCNC: 85.9 MG/DL
DEPRECATED RDW RBC AUTO: 48 FL (ref 37–54)
EGFRCR SERPLBLD CKD-EPI 2021: 37.9 ML/MIN/1.73
EOSINOPHIL # BLD AUTO: 0.3 10*3/MM3 (ref 0–0.4)
EOSINOPHIL NFR BLD AUTO: 4.9 % (ref 0.3–6.2)
ERYTHROCYTE [DISTWIDTH] IN BLOOD BY AUTOMATED COUNT: 14.6 % (ref 12.3–15.4)
GLOBULIN UR ELPH-MCNC: 3.5 GM/DL
GLUCOSE SERPL-MCNC: 125 MG/DL (ref 65–99)
HBA1C MFR BLD: 6.9 % (ref 4.8–5.6)
HCT VFR BLD AUTO: 45.4 % (ref 34–46.6)
HDLC SERPL-MCNC: 41 MG/DL (ref 40–60)
HGB BLD-MCNC: 14.9 G/DL (ref 12–15.9)
IMM GRANULOCYTES # BLD AUTO: 0.03 10*3/MM3 (ref 0–0.05)
IMM GRANULOCYTES NFR BLD AUTO: 0.5 % (ref 0–0.5)
LDLC SERPL CALC-MCNC: 57 MG/DL (ref 0–100)
LDLC/HDLC SERPL: 1.34 {RATIO}
LYMPHOCYTES # BLD AUTO: 2.05 10*3/MM3 (ref 0.7–3.1)
LYMPHOCYTES NFR BLD AUTO: 33.5 % (ref 19.6–45.3)
MCH RBC QN AUTO: 29.4 PG (ref 26.6–33)
MCHC RBC AUTO-ENTMCNC: 32.8 G/DL (ref 31.5–35.7)
MCV RBC AUTO: 89.7 FL (ref 79–97)
MICROALBUMIN/CREAT UR: 32.6 MG/G
MONOCYTES # BLD AUTO: 0.79 10*3/MM3 (ref 0.1–0.9)
MONOCYTES NFR BLD AUTO: 12.9 % (ref 5–12)
NEUTROPHILS NFR BLD AUTO: 2.87 10*3/MM3 (ref 1.7–7)
NEUTROPHILS NFR BLD AUTO: 46.9 % (ref 42.7–76)
NRBC BLD AUTO-RTO: 0 /100 WBC (ref 0–0.2)
PLATELET # BLD AUTO: 245 10*3/MM3 (ref 140–450)
PMV BLD AUTO: 12.9 FL (ref 6–12)
POTASSIUM SERPL-SCNC: 4.1 MMOL/L (ref 3.5–5.2)
PROT SERPL-MCNC: 7.3 G/DL (ref 6–8.5)
RBC # BLD AUTO: 5.06 10*6/MM3 (ref 3.77–5.28)
SODIUM SERPL-SCNC: 138 MMOL/L (ref 136–145)
TRIGL SERPL-MCNC: 121 MG/DL (ref 0–150)
TSH SERPL DL<=0.05 MIU/L-ACNC: 1.43 UIU/ML (ref 0.27–4.2)
VIT B12 BLD-MCNC: 481 PG/ML (ref 211–946)
VLDLC SERPL-MCNC: 22 MG/DL (ref 5–40)
WBC NRBC COR # BLD: 6.12 10*3/MM3 (ref 3.4–10.8)

## 2023-09-12 PROCEDURE — 82607 VITAMIN B-12: CPT

## 2023-09-12 PROCEDURE — 80050 GENERAL HEALTH PANEL: CPT

## 2023-09-12 PROCEDURE — 82570 ASSAY OF URINE CREATININE: CPT

## 2023-09-12 PROCEDURE — 82043 UR ALBUMIN QUANTITATIVE: CPT

## 2023-09-12 PROCEDURE — 80061 LIPID PANEL: CPT

## 2023-09-12 PROCEDURE — 83036 HEMOGLOBIN GLYCOSYLATED A1C: CPT

## 2023-09-18 ENCOUNTER — SPECIALTY PHARMACY (OUTPATIENT)
Dept: ENDOCRINOLOGY | Facility: CLINIC | Age: 68
End: 2023-09-18
Payer: MEDICARE

## 2023-09-18 ENCOUNTER — OFFICE VISIT (OUTPATIENT)
Dept: ENDOCRINOLOGY | Facility: CLINIC | Age: 68
End: 2023-09-18
Payer: MEDICARE

## 2023-09-18 VITALS
SYSTOLIC BLOOD PRESSURE: 114 MMHG | HEART RATE: 58 BPM | DIASTOLIC BLOOD PRESSURE: 50 MMHG | WEIGHT: 221 LBS | OXYGEN SATURATION: 95 % | BODY MASS INDEX: 34.69 KG/M2 | HEIGHT: 67 IN

## 2023-09-18 DIAGNOSIS — E11.21 TYPE 2 DIABETES WITH NEPHROPATHY: Primary | ICD-10-CM

## 2023-09-18 DIAGNOSIS — E78.2 MIXED DIABETIC HYPERLIPIDEMIA ASSOCIATED WITH TYPE 2 DIABETES MELLITUS: ICD-10-CM

## 2023-09-18 DIAGNOSIS — E11.69 MIXED DIABETIC HYPERLIPIDEMIA ASSOCIATED WITH TYPE 2 DIABETES MELLITUS: ICD-10-CM

## 2023-09-18 DIAGNOSIS — E11.59 HYPERTENSION ASSOCIATED WITH DIABETES: ICD-10-CM

## 2023-09-18 DIAGNOSIS — I15.2 HYPERTENSION ASSOCIATED WITH DIABETES: ICD-10-CM

## 2023-09-18 DIAGNOSIS — E11.42 DIABETIC POLYNEUROPATHY ASSOCIATED WITH TYPE 2 DIABETES MELLITUS: ICD-10-CM

## 2023-09-18 PROCEDURE — 3044F HG A1C LEVEL LT 7.0%: CPT | Performed by: INTERNAL MEDICINE

## 2023-09-18 PROCEDURE — 95251 CONT GLUC MNTR ANALYSIS I&R: CPT | Performed by: INTERNAL MEDICINE

## 2023-09-18 PROCEDURE — 99214 OFFICE O/P EST MOD 30 MIN: CPT | Performed by: INTERNAL MEDICINE

## 2023-09-18 PROCEDURE — 3078F DIAST BP <80 MM HG: CPT | Performed by: INTERNAL MEDICINE

## 2023-09-18 PROCEDURE — 3074F SYST BP LT 130 MM HG: CPT | Performed by: INTERNAL MEDICINE

## 2023-09-18 RX ORDER — (INSULIN DEGLUDEC AND LIRAGLUTIDE) 100; 3.6 [IU]/ML; MG/ML
50 INJECTION, SOLUTION SUBCUTANEOUS DAILY
Qty: 15 ML | Refills: 11 | Status: SHIPPED | OUTPATIENT
Start: 2023-09-18

## 2023-09-18 NOTE — PROGRESS NOTES
Specialty Pharmacy Refill Coordination Note     Ellen is a 68 y.o. female contacted today regarding new prescription specialty medication(s).    Reviewed and verified with patient:  Allergies       Specialty medication(s) and dose(s) confirmed: yes    Refill Questions      Flowsheet Row Most Recent Value   Changes to allergies? No   New conditions since last clinic visit No   Unplanned office visit, urgent care, ED, or hospital admission in the last 4 weeks  No   How does patient/caregiver feel medication is working? Good   Does this patient require a clinical escalation to a pharmacist? No            Delivery Questions      Flowsheet Row Most Recent Value   Delivery method [employer]   Delivery address correct? Yes   Delivery phone number [phone]   Number of medications in delivery 2   Medication being filled and delivered xultophy and pen needles   Is there any medication that is due not being filled? No   Supplies needed? Pen needles   Cooler needed? Yes   Do any medications need mixed or dated? No   Copay form of payment Credit card on file   Questions or concerns for the pharmacist? No   Are any medications first time fills? No          Patient was seen in the office today and was started on xultophy due to cost of other meds.  She is stopping kerendia, mounjaro and Tresiba today.  Pharmacist is aware. She only wants 1 month supply at this time to make sure she can tolerate it.          Follow-up: 1 months.      Sriram Longoria  Specialty Pharmacy Technician

## 2023-09-18 NOTE — PROGRESS NOTES
" Ellen Storey is a 68 y.o. female who presents for  evaluation of diabetes       HPI    Duration 10 years     Quality -  Now controlled     Severity -  severe    Complications - retinopathy , ckd stage III    Current symptoms/problems  none     Alleviating Factors: Compliance       Side Effects  none    Current diet  in general, a \"healthy\" diet      Current exercise walking    Current monitoring regimen: home blood tests - dexcom and at goal     Hypoglycemia none      PE    /50   Pulse 58   Ht 170.2 cm (67\")   Wt 100 kg (221 lb)   SpO2 95%   BMI 34.61 kg/m²   AOx3  No visible goiter  RRR  CTA  No Edema    Labs    Lab Results   Component Value Date    WBC 6.12 09/12/2023    HGB 14.9 09/12/2023    HCT 45.4 09/12/2023    MCV 89.7 09/12/2023     09/12/2023     Lab Results   Component Value Date    GLUCOSE 125 (H) 09/12/2023    BUN 27 (H) 09/12/2023    CREATININE 1.49 (H) 09/12/2023    EGFRIFAFRI 53 (L) 01/18/2022    BCR 18.1 09/12/2023    K 4.1 09/12/2023    CO2 24.0 09/12/2023    CALCIUM 9.9 09/12/2023    ALBUMIN 3.8 09/12/2023    AST 14 09/12/2023    ALT 13 09/12/2023     Lab Results   Component Value Date    EGFR 37.9 (L) 09/12/2023    EGFR 48.1 (L) 03/14/2023    EGFR 50.4 (L) 09/09/2022     Lab Results   Component Value Date    MALBCRERATIO 32.6 09/12/2023    MALBCRERATIO 46.1 03/14/2023    MALBCRERATIO 84.8 09/09/2022         Assessment & Plan       ICD-10-CM ICD-9-CM   1. Type 2 diabetes with nephropathy  E11.21 250.40     583.81   2. Mixed diabetic hyperlipidemia associated with type 2 diabetes mellitus  E11.69 250.80    E78.2 272.2   3. Hypertension associated with diabetes  E11.59 250.80    I15.2 401.9   4. Diabetic polyneuropathy associated with type 2 diabetes mellitus  E11.42 250.60     357.2         I reviewed and summarized records from Isauro Prado MD from current year  and I reviewed / ordered labs.   From review of records :    Pt has type 2 diabetes , at goal         Glycemic " "Management:   Lab Results   Component Value Date    HGBA1C 6.90 (H) 09/12/2023    HGBA1C 7.50 (H) 03/14/2023    HGBA1C 6.90 (H) 09/09/2022     Lab Results   Component Value Date    GLUCOSE 125 (H) 09/12/2023    BUN 27 (H) 09/12/2023    CREATININE 1.49 (H) 09/12/2023    EGFRIFAFRI 53 (L) 01/18/2022    BCR 18.1 09/12/2023    K 4.1 09/12/2023    CO2 24.0 09/12/2023    CALCIUM 9.9 09/12/2023    ALBUMIN 3.8 09/12/2023    AST 14 09/12/2023    ALT 13 09/12/2023    ANIONGAP 13.0 09/12/2023     Lab Results   Component Value Date    WBC 6.12 09/12/2023    HGB 14.9 09/12/2023    HCT 45.4 09/12/2023    MCV 89.7 09/12/2023     09/12/2023          Ambulatory Glucose Profile Report    Days Analyzed : 2 week period ending on 09/18/23      Continuous Glucose Monitory Device:  Dexcom G6     In range 91%  No lows     Interpretation : Diabetes Type 2 well controlled          Was on soliqua , then we changed to mounjaro but now too expensive    Go back to xultophy     40 units every morning    =================     synjardy one tablet with the first meal of the day   10/1000    ==========    Fiasp     12 to 30 that she has learned to estimate          --    Dyslipidemia on lipitor     Lab Results   Component Value Date    CHOL 120 09/12/2023    TRIG 121 09/12/2023    HDL 41 09/12/2023    LDL 57 09/12/2023       --    HTN controlled on lisinopril regimen   /50   Pulse 58   Ht 170.2 cm (67\")   Wt 100 kg (221 lb)   SpO2 95%   BMI 34.61 kg/m²     --  Microvascular complications  ckd stage 3aA2  Albuminuria decreasing     On lisinopril 40 , jardiance      Kerendia too expensive, stop       Lab Results   Component Value Date    EGFR 37.9 (L) 09/12/2023    EGFR 48.1 (L) 03/14/2023    EGFR 50.4 (L) 09/09/2022     Lab Results   Component Value Date    MALBCRERATIO 32.6 09/12/2023    MALBCRERATIO 46.1 03/14/2023    MALBCRERATIO 84.8 09/09/2022         ,     Retinopathy , stable, [place]  Left , injections    Mild neuropathy , " not painful   She has plantar fasciitis and tennis ball rolling is working, her daughter dx this     New Medications Ordered This Visit   Medications    Insulin Degludec-Liraglutide (Xultophy) 100-3.6 UNIT-MG/ML solution pen-injector subcutaneous pen     Sig: Inject 50 Units under the skin into the appropriate area as directed Daily.     Dispense:  15 mL     Refill:  11       Orders Placed This Encounter   Procedures    CBC Auto Differential    Comprehensive Metabolic Panel    Hemoglobin A1c    Lipid Panel    Microalbumin / Creatinine Urine Ratio - Urine, Clean Catch    TSH    Vitamin B12             This document has been electronically signed by Efren Crow MD on September 18, 2023 08:21 CDT